# Patient Record
Sex: MALE | Race: WHITE | NOT HISPANIC OR LATINO | URBAN - METROPOLITAN AREA
[De-identification: names, ages, dates, MRNs, and addresses within clinical notes are randomized per-mention and may not be internally consistent; named-entity substitution may affect disease eponyms.]

---

## 2017-01-01 ENCOUNTER — AMBULATORY - HEALTHEAST (OUTPATIENT)
Dept: CARDIOLOGY | Facility: CLINIC | Age: 72
End: 2017-01-01

## 2017-01-01 ENCOUNTER — COMMUNICATION - HEALTHEAST (OUTPATIENT)
Dept: INTERNAL MEDICINE | Facility: CLINIC | Age: 72
End: 2017-01-01

## 2017-01-01 ENCOUNTER — OFFICE VISIT - HEALTHEAST (OUTPATIENT)
Dept: INTERNAL MEDICINE | Facility: CLINIC | Age: 72
End: 2017-01-01

## 2017-01-01 ENCOUNTER — RECORDS - HEALTHEAST (OUTPATIENT)
Dept: ADMINISTRATIVE | Facility: OTHER | Age: 72
End: 2017-01-01

## 2017-01-01 ENCOUNTER — OFFICE VISIT - HEALTHEAST (OUTPATIENT)
Dept: INFECTIOUS DISEASES | Facility: CLINIC | Age: 72
End: 2017-01-01

## 2017-01-01 ENCOUNTER — OFFICE VISIT - HEALTHEAST (OUTPATIENT)
Dept: CARDIOLOGY | Facility: CLINIC | Age: 72
End: 2017-01-01

## 2017-01-01 ENCOUNTER — AMBULATORY - HEALTHEAST (OUTPATIENT)
Dept: INTERNAL MEDICINE | Facility: CLINIC | Age: 72
End: 2017-01-01

## 2017-01-01 ENCOUNTER — COMMUNICATION - HEALTHEAST (OUTPATIENT)
Dept: SCHEDULING | Facility: CLINIC | Age: 72
End: 2017-01-01

## 2017-01-01 ENCOUNTER — COMMUNICATION - HEALTHEAST (OUTPATIENT)
Dept: CARDIOLOGY | Facility: CLINIC | Age: 72
End: 2017-01-01

## 2017-01-01 ENCOUNTER — COMMUNICATION - HEALTHEAST (OUTPATIENT)
Dept: NURSING | Facility: CLINIC | Age: 72
End: 2017-01-01

## 2017-01-01 ENCOUNTER — COMMUNICATION - HEALTHEAST (OUTPATIENT)
Dept: INFECTIOUS DISEASES | Facility: CLINIC | Age: 72
End: 2017-01-01

## 2017-01-01 ENCOUNTER — RECORDS - HEALTHEAST (OUTPATIENT)
Dept: ADMINISTRATIVE | Facility: OTHER | Age: 72
End: 2017-01-01
Payer: COMMERCIAL

## 2017-01-01 ENCOUNTER — SURGERY - HEALTHEAST (OUTPATIENT)
Dept: CARDIOLOGY | Facility: CLINIC | Age: 72
End: 2017-01-01

## 2017-01-01 DIAGNOSIS — I10 ESSENTIAL HYPERTENSION WITH GOAL BLOOD PRESSURE LESS THAN 130/85: ICD-10-CM

## 2017-01-01 DIAGNOSIS — N18.30 CKD (CHRONIC KIDNEY DISEASE) STAGE 3, GFR 30-59 ML/MIN (H): ICD-10-CM

## 2017-01-01 DIAGNOSIS — E11.9 DIABETES MELLITUS, TYPE 2 (H): ICD-10-CM

## 2017-01-01 DIAGNOSIS — T82.7XXD ICD (IMPLANTABLE CARDIOVERTER-DEFIBRILLATOR) INFECTION, SUBSEQUENT ENCOUNTER: ICD-10-CM

## 2017-01-01 DIAGNOSIS — T82.7XXD: ICD-10-CM

## 2017-01-01 DIAGNOSIS — I50.22 CHRONIC SYSTOLIC HEART FAILURE (H): ICD-10-CM

## 2017-01-01 DIAGNOSIS — I33.0 ACUTE BACTERIAL ENDOCARDITIS: ICD-10-CM

## 2017-01-01 DIAGNOSIS — L08.9 WOUND INFECTION: ICD-10-CM

## 2017-01-01 DIAGNOSIS — I42.8 NICM (NONISCHEMIC CARDIOMYOPATHY) (H): ICD-10-CM

## 2017-01-01 DIAGNOSIS — T14.8XXA WOUND INFECTION: ICD-10-CM

## 2017-01-01 DIAGNOSIS — I34.0 NON-RHEUMATIC MITRAL REGURGITATION: ICD-10-CM

## 2017-01-01 DIAGNOSIS — I42.8 OTHER PRIMARY CARDIOMYOPATHIES: ICD-10-CM

## 2017-01-01 DIAGNOSIS — I42.0 CARDIOMYOPATHY, DILATED (H): ICD-10-CM

## 2017-01-01 DIAGNOSIS — I42.9 CARDIOMYOPATHY (H): ICD-10-CM

## 2017-01-01 DIAGNOSIS — I42.8 NONISCHEMIC CARDIOMYOPATHY (H): ICD-10-CM

## 2017-01-01 DIAGNOSIS — M10.9 GOUTY ARTHROPATHY: ICD-10-CM

## 2017-01-01 DIAGNOSIS — Z95.810 ICD (IMPLANTABLE CARDIOVERTER-DEFIBRILLATOR), BIVENTRICULAR, IN SITU: ICD-10-CM

## 2017-01-01 DIAGNOSIS — I44.7 LBBB (LEFT BUNDLE BRANCH BLOCK): ICD-10-CM

## 2017-01-01 DIAGNOSIS — B95.61 BACTEREMIA DUE TO STAPHYLOCOCCUS AUREUS: ICD-10-CM

## 2017-01-01 DIAGNOSIS — R78.81 BACTEREMIA DUE TO STAPHYLOCOCCUS AUREUS: ICD-10-CM

## 2017-01-01 LAB
ATRIAL RATE - MUSE: 71 BPM
CHOLEST SERPL-MCNC: 132 MG/DL
CHOLEST SERPL-MCNC: 132 MG/DL
CHOLEST SERPL-MCNC: 162 MG/DL
DIASTOLIC BLOOD PRESSURE - MUSE: NORMAL MMHG
FASTING STATUS PATIENT QL REPORTED: ABNORMAL
FASTING STATUS PATIENT QL REPORTED: YES
FASTING STATUS PATIENT QL REPORTED: YES
HBA1C MFR BLD: 6 % (ref 3.5–6)
HBA1C MFR BLD: 6.5 % (ref 3.5–6)
HBA1C MFR BLD: 8.4 % (ref 3.5–6)
HDLC SERPL-MCNC: 27 MG/DL
HDLC SERPL-MCNC: 31 MG/DL
HDLC SERPL-MCNC: 34 MG/DL
INTERPRETATION ECG - MUSE: NORMAL
LDLC SERPL CALC-MCNC: 64 MG/DL
LDLC SERPL CALC-MCNC: 75 MG/DL
LDLC SERPL CALC-MCNC: 96 MG/DL
P AXIS - MUSE: 68 DEGREES
PR INTERVAL - MUSE: 128 MS
QRS DURATION - MUSE: 158 MS
QT - MUSE: 452 MS
QTC - MUSE: 491 MS
R AXIS - MUSE: 124 DEGREES
SYSTOLIC BLOOD PRESSURE - MUSE: NORMAL MMHG
T AXIS - MUSE: 73 DEGREES
TRIGL SERPL-MCNC: 150 MG/DL
TRIGL SERPL-MCNC: 171 MG/DL
TRIGL SERPL-MCNC: 173 MG/DL
VENTRICULAR RATE- MUSE: 71 BPM

## 2017-01-01 RX ORDER — CARVEDILOL 12.5 MG/1
18.75 TABLET ORAL 2 TIMES DAILY WITH MEALS
Qty: 180 TABLET | Refills: 3 | Status: SHIPPED | OUTPATIENT
Start: 2017-01-01

## 2017-01-01 RX ORDER — ALBUTEROL SULFATE 90 UG/1
1-2 AEROSOL, METERED RESPIRATORY (INHALATION) EVERY 4 HOURS PRN
Qty: 1 INHALER | Refills: 11 | Status: SHIPPED | OUTPATIENT
Start: 2017-01-01

## 2017-01-01 RX ORDER — TAMSULOSIN HYDROCHLORIDE 0.4 MG/1
CAPSULE ORAL
Qty: 30 CAPSULE | Refills: 0 | Status: SHIPPED | OUTPATIENT
Start: 2017-01-01

## 2017-01-01 RX ORDER — FUROSEMIDE 40 MG
TABLET ORAL
Qty: 90 TABLET | Refills: 3 | Status: SHIPPED | OUTPATIENT
Start: 2017-01-01

## 2017-01-01 RX ORDER — BLOOD SUGAR DIAGNOSTIC
STRIP MISCELLANEOUS
Qty: 100 STRIP | Refills: 0 | Status: SHIPPED | OUTPATIENT
Start: 2017-01-01

## 2017-01-01 RX ORDER — SIMVASTATIN 20 MG
20 TABLET ORAL AT BEDTIME
Qty: 30 TABLET | Refills: 5 | Status: SHIPPED | OUTPATIENT
Start: 2017-01-01

## 2017-01-01 RX ORDER — ALLOPURINOL 300 MG/1
TABLET ORAL
Qty: 90 TABLET | Refills: 3 | Status: SHIPPED | OUTPATIENT
Start: 2017-01-01

## 2017-01-01 RX ORDER — SACCHAROMYCES BOULARDII 250 MG
250 CAPSULE ORAL 2 TIMES DAILY
Status: SHIPPED | COMMUNITY
Start: 2017-01-01

## 2017-01-01 RX ORDER — FUROSEMIDE 40 MG
0.5 TABLET ORAL 2 TIMES DAILY
Refills: 3 | Status: SHIPPED | COMMUNITY
Start: 2017-01-01

## 2017-01-01 ASSESSMENT — MIFFLIN-ST. JEOR
SCORE: 1455.21
SCORE: 1468.81
SCORE: 1477.89
SCORE: 1502.83
SCORE: 1459.74
SCORE: 1489.22
SCORE: 1491.49
SCORE: 1473.35
SCORE: 1477.89
SCORE: 1502.83
SCORE: 1488.31
SCORE: 1484.68
SCORE: 1471.08
SCORE: 1484.68

## 2017-08-28 ENCOUNTER — COMMUNICATION - HEALTHEAST (OUTPATIENT)
Dept: INTERNAL MEDICINE | Facility: CLINIC | Age: 72
End: 2017-08-28

## 2021-05-26 ENCOUNTER — RECORDS - HEALTHEAST (OUTPATIENT)
Dept: ADMINISTRATIVE | Facility: CLINIC | Age: 76
End: 2021-05-26

## 2021-05-30 ENCOUNTER — RECORDS - HEALTHEAST (OUTPATIENT)
Dept: ADMINISTRATIVE | Facility: CLINIC | Age: 76
End: 2021-05-30

## 2021-05-30 VITALS — BODY MASS INDEX: 22.82 KG/M2 | WEIGHT: 163 LBS | HEIGHT: 71 IN

## 2021-05-30 VITALS — HEIGHT: 72 IN | WEIGHT: 162 LBS | BODY MASS INDEX: 21.94 KG/M2

## 2021-05-30 VITALS — BODY MASS INDEX: 21.94 KG/M2 | WEIGHT: 162 LBS | HEIGHT: 72 IN

## 2021-05-30 VITALS — BODY MASS INDEX: 22.26 KG/M2 | WEIGHT: 159 LBS | HEIGHT: 71 IN

## 2021-05-30 VITALS — WEIGHT: 158 LBS | BODY MASS INDEX: 22.12 KG/M2 | HEIGHT: 71 IN

## 2021-05-30 VITALS — WEIGHT: 157 LBS | BODY MASS INDEX: 21.9 KG/M2

## 2021-05-30 VITALS — WEIGHT: 159 LBS | HEIGHT: 72 IN | BODY MASS INDEX: 21.54 KG/M2

## 2021-05-30 VITALS — BODY MASS INDEX: 21.4 KG/M2 | WEIGHT: 158 LBS | HEIGHT: 72 IN

## 2021-05-30 VITALS — WEIGHT: 156 LBS | BODY MASS INDEX: 21.76 KG/M2

## 2021-05-30 VITALS — HEIGHT: 71 IN | WEIGHT: 160 LBS | BODY MASS INDEX: 22.4 KG/M2

## 2021-05-30 VITALS — BODY MASS INDEX: 21.51 KG/M2 | WEIGHT: 158.8 LBS | HEIGHT: 72 IN

## 2021-05-30 VITALS — BODY MASS INDEX: 22.4 KG/M2 | WEIGHT: 160 LBS | HEIGHT: 71 IN

## 2021-05-31 VITALS — HEIGHT: 71 IN | BODY MASS INDEX: 21.84 KG/M2 | WEIGHT: 156 LBS

## 2021-05-31 VITALS — WEIGHT: 155 LBS | BODY MASS INDEX: 21.7 KG/M2 | HEIGHT: 71 IN

## 2021-06-01 ENCOUNTER — RECORDS - HEALTHEAST (OUTPATIENT)
Dept: ADMINISTRATIVE | Facility: CLINIC | Age: 76
End: 2021-06-01

## 2021-06-08 NOTE — PROGRESS NOTES
Office Visit - Physical    Esteban Zambrano   71 y.o. male    Date of Visit: 1/6/2017    Chief Complaint   Patient presents with     Pre-op Exam     EP ICD insert  Dr. Jese Menendez at [facility]  on 1/18/2017       Subjective: Preoperative examination in anticipation of ICD pacemaker insertion Dr. Jese Menendez January 18, 2017 [facility] catheterization lab.    71-year-old male with history of nonischemic cardiomyopathy with ejection fractions around 35%.  No signs or symptoms of volume overload denies paroxysmal nocturnal dyspnea or leg edema generally feels well.  Denies palpitations or chest pain.  Not lightheaded.  Patient's had a long history of nonischemic cardiomyopathy and prior history of ejection fractions as well as 15 or 20% the patient did have a pacemaker defibrillator device removed because of endocarditis and bacteremia with methicillin sensitive staph aureus intermittently since the fall of 2015.  Hospitalized at [facility] as well as at [facility].    ROS: A comprehensive review of systems was performed and was otherwise negative    Medications:   Prior to Admission medications    Medication Sig Start Date End Date Taking? Authorizing Provider   allopurinol (ZYLOPRIM) 300 MG tablet Take 1 tablet (300 mg total) by mouth daily. 7/27/16  Yes Emani King MD   aspirin (ASPIR-81) 81 MG EC tablet Take 1 tablet (81 mg total) by mouth daily with supper. 7/27/16  Yes Emani King MD   carvedilol (COREG) 25 MG tablet Take 1 tablet (25 mg total) by mouth 2 (two) times a day with meals. 12/19/16  Yes Jesus Acevedo MD   cholecalciferol, vitamin D3, 1,000 unit tablet Take 1 tablet (1,000 Units total) by mouth daily. 7/27/16  Yes Emani King MD   ferrous sulfate 325 (65 FE) MG tablet Take 1 tablet by mouth daily with breakfast. 7/27/16  Yes Emani King MD   furosemide (LASIX) 20 MG tablet Take 1 tablet (20 mg total) by mouth daily.  Patient taking differently: Take  "20 mg by mouth 2 (two) times a day at 9am and 6pm.  7/27/16  Yes Emani King MD   JANUVIA 50 mg tablet TAKE 1 TABLET BY MOUTH DAILY. 12/10/16  Yes Jesus Acevedo MD   pirbuterol (MAXAIR) 200 mcg/Inhalation inhaler Inhale 2 puffs 4 (four) times a day as needed for wheezing. Prn   Yes PROVIDER, HISTORICAL   potassium chloride SA (K-DUR,KLOR-CON) 20 MEQ tablet Take 1 tablet (20 mEq total) by mouth 2 (two) times a day. 10/21/16 10/21/17 Yes Jesus Acevedo MD   simvastatin (ZOCOR) 20 MG tablet Take 1 tablet (20 mg total) by mouth bedtime. 7/27/16  Yes Emani King MD   tamsulosin (FLOMAX) 0.4 mg Cp24 TAKE 1 CAPSULE BY MOUTH ONCE DAILY AFTER SUPPER 12/19/16  Yes Jesus Acevedo MD   triamcinolone (KENALOG) 0.1 % ointment Apply topically 2 (two) times a day.   Yes PROVIDER, HISTORICAL   ACCU-CHEK GUILLAUME PLUS TEST STRP strips TEST THREE TIMES DAILY 12/23/16   Bakari Rick MD       Allergies:  Allergies   Allergen Reactions     Cefazolin Other (See Comments)     Severe neutropenia     Sulfa (Sulfonamide Antibiotics) Anaphylaxis     Throat swelling.     Dimethicone Rash     Atorvastatin      Hair Loss     Lisinopril Unknown     Losartan Other (See Comments)     Altered vision \"white out\"       Immunizations:   Immunization History   Administered Date(s) Administered     DT (pediatric) 01/01/2000     Influenza [id number], 01/04/2010     Influenza, inj, historic 11/07/2007, 10/23/2008, 09/03/2015     Pneumo Polysac 23-V 10/08/2009     Td, historic 01/01/2000, 07/07/2011     Tdap 07/11/2011       Health Maintenance: Immunizations reviewed and up-to-date.    Past Medical History: Complicated past medical history significant for diabetes mellitus type 2 with chronic anemia and history of acute kidney failure on top of chronic kidney disease stage III.    Nonischemic cardiomyopathy previously required pacemaker biventricular and ICD.    History of C. diff colitis and thrombocytopenia and lymphocytopenia related " to medication treatment.    History of methicillin sensitive staph aureus bacteremia and endocarditis with probable vegetation on prior right atrial lead now removed.  Previous treatment with long-term antibiotic therapy 6 weeks and infectious disease consultation was along with cardiology consultation.  An nephrology consultation chronic kidney disease with acute kidney injury previously.  See problem list and allergy list for details.    Nonsmoker no excess alcohol.  Other past medical and surgical history see old records.  Prior surgical history includes that of cardiac pacemaker implantation with defibrillator along with vasectomy nasal surgery cataract surgery.  Team line insertion as well as vasectomy in the remote past    : Family history reviewed includes Alzheimer's disease in his mother heart disease in his father.  One daughter with thyroid nodule to be removed January 11, 2017 Dr. Maryam Mendez at [facility].  Wife with osteoarthritis and very supportive Ursula.    Family History: See above.    Social History: Retired    Exam Chest clear to auscultation and percussion.  Heart tones regular rhythm without murmur rub or gallop.  Abdomen soft nontender no organomegaly.  No peritoneal signs.  Extremities free of edema cyanosis or clubbing.  Neck veins nondistended no thyromegaly or scleral icterus noted, carotids full.  Skin warm and dry easily conversant good spirited.  Normal intelligence.  Neurologically intact no gross localizing findings.  Not in acute distress not toxic he does appear stronger from a chronically ill standpoint.  No tachypnea or central acrocyanosis severe eczema previously noted again confirmed but not secondarily infected.    Assessment and Plan  Nonischemic cardiomyopathy with impaired ejection fraction and systolic performance needs ICD and biventricular pacer as he previously had.    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 diff toxin associated colitis lymphocytopenia and thrombocytopenia related to antibiotic therapy.  See record and problem list.    Diabetes mellitus type 2 plus or eczema.  And history of acute kidney injury resolved on top of chronic kidney disease CK D3.    Total time spent with the patient today was 40 minutes of which greater than 50% was spent in counseling and coordination of care.    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

## 2021-06-08 NOTE — PROGRESS NOTES
Office Visit - Follow up    Esteban Zambrano   71 y.o. male    Date of Visit: 2/9/2017    Chief Complaint   Patient presents with     Hospital Visit Follow Up     ICD pacemaker insertion     Hypertension     Diabetes     Pain     thumb       Subjective: Gout.    Painful right thumb at the base.  Minimally swollen not hot not read woke up this morning with it.  Hard to move it.  Very painful.    Hospital follow-up from January 18, 2017 history of pacemaker reinsertion biventricular for nonischemic cardiomyopathy.  Also hypertension and hyper glycemia with diabetes mellitus type 2 and hyperlipidemia.    No blood in stool or urine.  No chest pain or shortness of breath denies paroxysmal nocturnal dyspnea.  Anticipate screws to the [place] 10 days in 1 week.  If no better in one week I did suggest prednisone therapy for his right upper painful thumb area could be gout uric acid level pending.    Medication list reviewed and we will tolerated.  For this 71-year-old male.    ROS: A comprehensive review of systems was performed and was otherwise negative    Medications:  Prior to Admission medications    Medication Sig Start Date End Date Taking? Authorizing Provider   allopurinol (ZYLOPRIM) 300 MG tablet Take 1 tablet (300 mg total) by mouth daily. 7/27/16  Yes Emani King MD   aspirin (ASPIR-81) 81 MG EC tablet Take 1 tablet (81 mg total) by mouth daily with supper. 7/27/16  Yes Emani King MD   carvedilol (COREG) 25 MG tablet Take 1 tablet (25 mg total) by mouth 2 (two) times a day with meals. 12/19/16  Yes Jesus Acevedo MD   cholecalciferol, vitamin D3, 1,000 unit tablet Take 1 tablet (1,000 Units total) by mouth daily. 7/27/16  Yes Emani King MD   ferrous sulfate 325 (65 FE) MG tablet Take 1 tablet by mouth daily with breakfast. 7/27/16  Yes Emani King MD   furosemide (LASIX) 20 MG tablet Take 20 mg by mouth 2 (two) times a day at 9am and 6pm.   Yes PROVIDER, MONSE   JANUVIA 50 mg tablet  "TAKE 1 TABLET BY MOUTH DAILY. 1/15/17  Yes Jesus Acevedo MD   pirbuterol (MAXAIR) 200 mcg/Inhalation inhaler Inhale 2 puffs 4 (four) times a day as needed for wheezing. Prn   Yes PROVIDER, HISTORICAL   potassium chloride SA (K-DUR,KLOR-CON) 20 MEQ tablet Take 1 tablet (20 mEq total) by mouth 2 (two) times a day. 10/21/16 10/21/17 Yes Jesus Acevedo MD   simvastatin (ZOCOR) 20 MG tablet Take 1 tablet (20 mg total) by mouth bedtime. 7/27/16  Yes Emani King MD   tamsulosin (FLOMAX) 0.4 mg Cp24 TAKE 1 CAPSULE BY MOUTH ONCE DAILY AFTER SUPPER 1/22/17  Yes Jesus Acevedo MD   triamcinolone (KENALOG) 0.1 % ointment Apply topically 2 (two) times a day.   Yes PROVIDER, HISTORICAL   ACCU-CHEK GUILLAUME PLUS TEST STRP strips TEST THREE TIMES DAILY 12/23/16   Bakari Rick MD       Allergies:   Allergies   Allergen Reactions     Cefazolin Other (See Comments)     Severe neutropenia     Sulfa (Sulfonamide Antibiotics) Anaphylaxis     Throat swelling.     Dimethicone Rash     Atorvastatin      Hair Loss     Lisinopril Unknown     Losartan Other (See Comments)     Altered vision \"white out\"       Immunizations:   Immunization History   Administered Date(s) Administered     DT (pediatric) 01/01/2000     Influenza [id number], 01/04/2010     Influenza, inj, historic 11/07/2007, 10/23/2008, 09/03/2015     Pneumo Conj 13-V (2010&after) 02/09/2017     Pneumo Polysac 23-V 10/08/2009     Td, historic 01/01/2000, 07/07/2011     Tdap 07/11/2011       Exam painful right thumb base and interphalangeal joint of the right thumb.  He is right-hand dominant.    Chest clear.  Heart tones normal.  Abdomen benign.  Extremities free of edema.  Neck veins nondistended no thyromegaly or scleral icterus skin showed eczematous changes good color wife is at his side and supportive neurologic and psych normal.    Assessment and Plan   Gout check uric acid level today of her prednisone therapy 30 mg daily for 5 days now but patient " declined.  We will use heat or ice topically to the right thumb area plus uric acid level will be checked plus acetaminophen 1000 mg 3 times a day and a scheduled basis per B7 days.  Ibuprofen and the like maybe to harmful in a patient with known multiple medical problems and prior history of chronic kidney disease worsening of renal function.    Nonischemic cardiomyopathy status post biventricular pacer.    History of infective endocarditis.    Hypertension and hyperlipidemia.    Diabetes mellitus type 2 with next office visit in one month we'll do fasting diabetic labs to include A1c lipid panel urine for microalbumin and blood sugar.  RTC 1 month.  Call in 1 week or less prior to trip pending symptoms of his right thumb.    Time: total time spent with the patient was 25 minutes of which >50% was spent in counseling and coordination of care    The following high BMI interventions were performed this visit: encouragement to exercise    Jesus Acevedo MD    Patient Active Problem List   Diagnosis     Gout     Tendonitis Of The Right Achilles Tendon     Anemia     Type 2 Diabetes Mellitus     Essential Hypertension     CKD (chronic kidney disease) stage 3, GFR 30-59 ml/min     Diabetes     NICM (nonischemic cardiomyopathy)     HTN (hypertension)     Frozen shoulder syndrome, right     Bacteremia due to Staphylococcus aureus     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

## 2021-06-08 NOTE — PROGRESS NOTES
"DEVICE CLINIC RN POST-OP NOTE    Reason for visit: Post-operative wound and device check; s/p re-implant of cardiac resynchronization therapy defibrillator (CRT-D)     Device: [employer] G148 INOGEN X4 CRT-D, RA Lead: [employer] [id number] [address] II Sterox EZ, RV Lead: [employer] [id number] [place] 4-Site SG LV Lead: [employer] [id number] Acuity X4 Straight  Procedure date: 01/18/2017  Implant Diagnosis: Non-ischemic Cardiomyopathy, Left Bundle Branch Block, S/P ICD extraction endocarditis July 2016  Implanting Physician: Dr. Jese Menendez      Assessment  Subjective: \"I'm feeling just fine.  No problems.\"  Physical Assessment: Mr. Cueva seeing Jo Tyson, RN, CNP in follow-up today, please refer to her note for a full physical assessment.  Incision/device pocket: Clean, dry and intact with resolving ecchymosis and edema.  Ecchymosis extends into left breast.  There are no signs of infection present.  The Steri-strips were removed at today's visit and the area cleaned of residual adhesive.      Device Findings  Interrogation of device reveals normal sensing and capture thresholds  See the [employer] Report for a full summary of the device information.        Patient Education  Esteban Zambrano was accompanied today by his wifeKim Banks.     [facility]'s Partnership Agreement for Device Patients and Post-operative Checklist were presented and reviewed with patient at today's appointment.    Signs and symptoms of infection, poor wound healing, and normal device function were reviewed. Range of motion and weight restrictions for the left arm are for four weeks. He was issued a Med[employer] DX NXT remote monitor and instructed on its set-up and use for remote monitoring by the [employer] representative prior to hospital discharge. These instructions were reviewed with the patient at today s visit.       Plan  - Remote device check on 02/27/2017  - Three month " in-clinic device check April 2017    Annmarie Calvin, RN, MA  Device Nurse  [facility]

## 2021-06-09 ENCOUNTER — RECORDS - HEALTHEAST (OUTPATIENT)
Dept: ADMINISTRATIVE | Facility: CLINIC | Age: 76
End: 2021-06-09

## 2021-06-09 NOTE — PROGRESS NOTES
Office Visit - Follow up    Esteban Zambrano   71 y.o. male    Date of Visit: 3/7/2017    Chief Complaint   Patient presents with     Hospital Visit Follow Up       Subjective: diabetes  mellitus type II with gout flare right wrist.  Right-handed.  Cannot use right hand.    On cruise ship and in [place] port became ill with signs and symptoms of recurrent endocarditis.  Staphylococcal organism.  ICD pacemaker device removed upon air lifting to [place].  Transesophageal echo also done.  Full details in extensive medical records reviewed today.  On vancomycin versus nafcillin every 6 hours.  Also allopurinol dose noted 300 mg daily prior history of chronic kidney disease may need adjustment downward.  On cruise ship when came down with a fever was noted to have endocarditis.  Treated with vancomycin.  There is some record of nafcillin having been administered as well.    No blood in stool or urine no chest pain or shortness of breath.  Seeing cardiologist tomorrow.  ROS: A comprehensive review of systems was performed and was otherwise negative    Medications:  Prior to Admission medications    Medication Sig Start Date End Date Taking? Authorizing Provider   allopurinol (ZYLOPRIM) 300 MG tablet Take 1 tablet (300 mg total) by mouth daily. 7/27/16  Yes Emani King MD   aspirin (ASPIR-81) 81 MG EC tablet Take 1 tablet (81 mg total) by mouth daily with supper. 7/27/16  Yes Emani King MD   carvedilol (COREG) 25 MG tablet Take 1 tablet (25 mg total) by mouth 2 (two) times a day with meals. 12/19/16  Yes Jesus Acevedo MD   cholecalciferol, vitamin D3, 1,000 unit tablet Take 1 tablet (1,000 Units total) by mouth daily. 7/27/16  Yes Emani King MD   ferrous sulfate 325 (65 FE) MG tablet Take 1 tablet by mouth daily with breakfast. 7/27/16  Yes Emani King MD   furosemide (LASIX) 20 MG tablet Take 20 mg by mouth 2 (two) times a day at 9am and 6pm.   Yes PROVIDER, MONSE   JANUVIA 50 mg tablet TAKE 1 TABLET  "BY MOUTH DAILY. 2/10/17  Yes Jesus Acevedo MD   pirbuterol (MAXAIR) 200 mcg/Inhalation inhaler Inhale 2 puffs 4 (four) times a day as needed for wheezing. Prn   Yes PROVIDER, HISTORICAL   potassium chloride SA (K-DUR,KLOR-CON) 20 MEQ tablet Take 1 tablet (20 mEq total) by mouth 2 (two) times a day. 10/21/16 10/21/17 Yes Jesus Acevedo MD   Saccharomyces boulardii (FLORASTOR) 250 mg capsule Take 250 mg by mouth 3 (three) times a day.   Yes PROVIDER, HISTORICAL   simvastatin (ZOCOR) 20 MG tablet Take 1 tablet (20 mg total) by mouth bedtime. 7/27/16  Yes Emani King MD   tamsulosin (FLOMAX) 0.4 mg Cp24 TAKE 1 CAPSULE BY MOUTH ONCE DAILY AFTER SUPPER 2/10/17  Yes Jesus Acevedo MD   triamcinolone (KENALOG) 0.1 % ointment Apply topically 2 (two) times a day.   Yes PROVIDER, HISTORICAL   vancomycin (VANCOCIN) 250 mg/5 mL Syrg suspension Take by mouth.   Yes PROVIDER, HISTORICAL   ACCU-CHEK GUILLAUME PLUS TEST STRP strips TEST THREE TIMES DAILY 12/23/16   Bakari Rick MD   colchicine 0.6 mg tablet Take 1 tablet (0.6 mg total) by mouth 2 (two) times a day. 3/7/17 3/7/18  Jesus Acevedo MD   predniSONE (DELTASONE) 10 MG tablet Take 3 tablets daily for 5 days. 2/13/17 3/7/17  Jesus Acevedo MD       Allergies:   Allergies   Allergen Reactions     Cefazolin Other (See Comments)     Severe neutropenia     Sulfa (Sulfonamide Antibiotics) Anaphylaxis     Throat swelling.     Dimethicone Rash     Atorvastatin      Hair Loss     Lisinopril Unknown     Losartan Other (See Comments)     Altered vision \"white out\"       Immunizations:   Immunization History   Administered Date(s) Administered     DT (pediatric) 01/01/2000     Influenza [id number], 01/04/2010     Influenza, inj, historic 11/07/2007, 10/23/2008, 09/03/2015     Pneumo Conj 13-V (2010&after) 02/09/2017     Pneumo Polysac 23-V 10/08/2009     Td, historic 01/01/2000, 07/07/2011     Tdap 07/11/2011       Exam Chest clear to auscultation and " percussion.  Heart tones regular rhythm without murmur rub or gallop.  Abdomen soft nontender no organomegaly.  No peritoneal signs.  Extremities free of edema cyanosis or clubbing.  Neck veins nondistended no thyromegaly or scleral icterus noted, carotids full.  Skin warm and dry easily conversant good spirited.  Normal intelligence.  Neurologically intact no gross localizing findings.  Eczematous skin and past health history of diabetes mellitus set patient up for contamination of any and no vascular device like ICD pacemaker.    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  Needs cardiac follow-up with infectious disease consultation as well.  Diabetes mellitus type 2 check hemogram plus comprehensive metabolic profile A1c lipid panel uric acid level today.    Gout flare right wrist.  Right-hand-dominant multiple drug allergies noted see chart.  Start colchicine 0.6 mg twice daily return to clinic March 23, 2017    Time: total time spent with the patient was 40 minutes of which >50% was spent in counseling and coordination of care    The following high BMI interventions were performed this visit: encouragement to exercise    Jesus Acevedo MD    Patient Active Problem List   Diagnosis     Gout     Tendonitis Of The Right Achilles Tendon     Anemia     Type 2 Diabetes Mellitus     Essential Hypertension     CKD (chronic kidney disease) stage 3, GFR 30-59 ml/min     Diabetes     NICM (nonischemic cardiomyopathy)     HTN (hypertension)     Frozen shoulder syndrome, right     Bacteremia due to Staphylococcus aureus     Recurrent colitis due to Clostridium difficile     Infected defibrillator, initial encounter     Acute bacterial endocarditis     Hypokalemia     Infection involving implantable cardioverter-defibrillator, subsequent encounter     Infective endocarditis     Onychauxis     Anemia of chronic disease     Suspected deep tissue injury     Venous ulcer of left leg      LBBB (left bundle branch block)     Heart failure with reduced ejection fraction     ICD (implantable cardioverter-defibrillator), biventricular, in situ

## 2021-06-09 NOTE — PROGRESS NOTES
Office Visit - Follow up    Esteban Zambrano   71 y.o. male    Date of Visit: 4/6/2017    Chief Complaint   Patient presents with     Endocarditis     follow up     Diabetes       Subjective: Diabetes mellitus type 2.  Recurrent endocarditis ×4.  Secondary to implantable device defibrillator plus pacemaker.  Now all implantable cardio device cardiac devices are out.  Needs refill Maxair will try albuterol metered-dose inhaler.    No blood in stool or urine.  Feels stronger.  Accompanied by his wife slightly randall randall in complexion especially cheeks the patient has been receiving nafcillin IV at home with a PICC line every 4 hours.  The patient has had for symptoms of bacterial endocarditis include chills with high fever.  The skin is broken down from chronic eczema.    First sign of illness with endocarditis his fever.    No blood in stool or urine or fever now medication list reviewed generally well-tolerated.  Denies syncope or palpitations.    ROS: A comprehensive review of systems was performed and was otherwise negative    Medications:  Prior to Admission medications    Medication Sig Start Date End Date Taking? Authorizing Provider   allopurinol (ZYLOPRIM) 300 MG tablet Take 1 tablet (300 mg total) by mouth daily. 7/27/16  Yes Emani King MD   aspirin (ASPIR-81) 81 MG EC tablet Take 1 tablet (81 mg total) by mouth daily with supper. 7/27/16  Yes Emani King MD   carvedilol (COREG) 25 MG tablet Take 0.5 tablets (12.5 mg total) by mouth 2 (two) times a day with meals. 3/8/17  Yes Jese Menendez MD   cholecalciferol, vitamin D3, 1,000 unit tablet Take 1 tablet (1,000 Units total) by mouth daily. 7/27/16  Yes Emani King MD   colchicine 0.6 mg tablet Take 1 tablet (0.6 mg total) by mouth 2 (two) times a day. 3/7/17 3/7/18 Yes Jesus Acevedo MD   ferrous sulfate 325 (65 FE) MG tablet Take 1 tablet by mouth daily with breakfast. 7/27/16  Yes Emani King MD   furosemide (LASIX) 20 MG tablet Take 20  "mg by mouth 2 (two) times a day at 9am and 6pm.   Yes PROVIDER, HISTORICAL   JANUVIA 50 mg tablet TAKE 1 TABLET BY MOUTH DAILY. 4/2/17  Yes Jesus Acevedo MD   pirbuterol (MAXAIR) 200 mcg/Inhalation inhaler Inhale 2 puffs 4 (four) times a day as needed for wheezing. Prn   Yes PROVIDER, HISTORICAL   potassium chloride SA (K-DUR,KLOR-CON) 20 MEQ tablet Take 1 tablet (20 mEq total) by mouth 2 (two) times a day. 10/21/16 10/21/17 Yes Jesus Acevedo MD   Saccharomyces boulardii (FLORASTOR) 250 mg capsule Take 250 mg by mouth 2 (two) times a day.    Yes PROVIDER, HISTORICAL   simvastatin (ZOCOR) 20 MG tablet Take 1 tablet (20 mg total) by mouth bedtime. 7/27/16  Yes Emani King MD   tamsulosin (FLOMAX) 0.4 mg Cp24 TAKE 1 CAPSULE BY MOUTH ONCE DAILY AFTER SUPPER 3/12/17  Yes Jesus Acevedo MD   triamcinolone (KENALOG) 0.1 % ointment Apply topically 2 (two) times a day.   Yes PROVIDER, HISTORICAL   vancomycin (VANCOCIN) 250 mg/5 mL Syrg suspension Take by mouth.   Yes PROVIDER, HISTORICAL   ACCU-CHEK GUILLAUME PLUS TEST STRP strips TEST THREE TIMES DAILY 12/23/16   Bakari Rick MD   albuterol (PROVENTIL HFA;VENTOLIN HFA) 90 mcg/actuation inhaler Inhale 1-2 puffs every 4 (four) hours as needed for wheezing. 4/6/17   Jesus Acevedo MD   citric acid-sodium citrate (BICITRA) 500-334 mg/5 mL solution TAKE 30 ML BY MOUTH EVERY 8 HOURS FOR 10 DAYS 3/6/17 4/6/17  PROVIDER, HISTORICAL   simvastatin (ZOCOR) 20 MG tablet TAKE ONE TABLET BY MOUTH NIGHTLY AT BEDTIME 4/2/17 4/6/17  Jesus Acevedo MD       Allergies:   Allergies   Allergen Reactions     Cefazolin Other (See Comments)     Severe neutropenia     Sulfa (Sulfonamide Antibiotics) Anaphylaxis     Throat swelling.     Dimethicone Rash     Atorvastatin      Hair Loss     Lisinopril Unknown     Losartan Other (See Comments)     Altered vision \"white out\"       Immunizations:   Immunization History   Administered Date(s) Administered     DT " (pediatric) 01/01/2000     Influenza [id number], 01/04/2010     Influenza, inj, historic 11/07/2007, 10/23/2008, 09/03/2015     Pneumo Conj 13-V (2010&after) 02/09/2017     Pneumo Polysac 23-V 10/08/2009     Td, historic 01/01/2000, 07/07/2011     Tdap 07/11/2011       Exam Chest clear to auscultation and percussion.  Heart tones regular rhythm without murmur rub or gallop.  Abdomen soft nontender no organomegaly.  No peritoneal signs.  Extremities free of edema cyanosis or clubbing.  Neck veins nondistended no thyromegaly or scleral icterus noted, carotids full.  Skin warm and dry easily conversant good spirited.  Normal intelligence.  Neurologically intact no gross localizing findings.    Assessment and Plan  Diabetes mellitus type 2.  Check A1c plus lipid panel urine for microalbumin and blood sugar today nonfasting.    Eczema severe with skin breakdown.  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 Sal Brown infectious disease.    Multiple drug allergies including dimethicone atorvastatin lisinopril and losartan.    History of recurrent endocarditis all intracardiac devices are now out followed by Dr. Menendez.    Time: total time spent with the patient was 25 minutes of which >50% was spent in counseling and coordination of care    The following high BMI interventions were performed this visit: encouragement to exercise    Jesus Acevedo MD    Patient Active Problem List   Diagnosis     Gout     Tendonitis Of The Right Achilles Tendon     Anemia     Type 2 Diabetes Mellitus     Essential Hypertension     CKD (chronic kidney disease) stage 3, GFR 30-59 ml/min     Diabetes     NICM (nonischemic cardiomyopathy)     HTN (hypertension)     Frozen shoulder syndrome, right     Bacteremia due to Staphylococcus aureus     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

## 2021-06-09 NOTE — PROGRESS NOTES
Office Visit - Follow up    Esteban Zambrano   71 y.o. male    Date of Visit: 3/23/2017    Chief Complaint   Patient presents with     Follow-up     Has been experiencing dizziness and chills Wife reports a fever yesterday       Subjective: Infective endocarditis with diabetes mellitus and recent spell of gout right wrist and hand the latter improving.  The patient's nafcillin dose is being adjusted upward to every 4 hours from every 6 hours from his infectious disease specialist Dr. Sal Brown.  The patient feels dizzy on higher dose of nafcillin or the frequency at every 4 hours compared every 6 hours.  Discussed with patient and his wife also in attendance.    No fever or chills no night sweats.    History of eczema plus ischemic cardiomyopathy status post ICD pacemaker explantation again.  Dr. Menendez is consulting cardiac electrophysiologist says no more pacemakers defibrillators.    Because of the risk of endocarditis requiring 2 explantation's.    No shortness of breath chest pain or paroxysmal nocturnal dyspnea no leg edema.  Reemphasized salt restriction and diet.    Trip interrupted cruise recently on February 19, 2017 forms were completed in detail for trip insurance.  No blood in stool or urine medication list reviewed.  Dizziness with higher nafcillin dose discussed suggest reconsultation with infectious disease specialist in this regard.    ROS: A comprehensive review of systems was performed and was otherwise negative    Medications:  Prior to Admission medications    Medication Sig Start Date End Date Taking? Authorizing Provider   ACCU-CHEK GUILLAUME PLUS TEST STRP strips TEST THREE TIMES DAILY 12/23/16  Yes Bakari Rick MD   allopurinol (ZYLOPRIM) 300 MG tablet Take 1 tablet (300 mg total) by mouth daily. 7/27/16  Yes Emani King MD   aspirin (ASPIR-81) 81 MG EC tablet Take 1 tablet (81 mg total) by mouth daily with supper. 7/27/16  Yes Emani King MD   carvedilol (COREG) 25 MG tablet  Take 0.5 tablets (12.5 mg total) by mouth 2 (two) times a day with meals. 3/8/17  Yes Jese Menendez MD   cholecalciferol, vitamin D3, 1,000 unit tablet Take 1 tablet (1,000 Units total) by mouth daily. 7/27/16  Yes Emani King MD   citric acid-sodium citrate (BICITRA) 500-334 mg/5 mL solution TAKE 30 ML BY MOUTH EVERY 8 HOURS FOR 10 DAYS 3/6/17  Yes PROVIDER, HISTORICAL   colchicine 0.6 mg tablet Take 1 tablet (0.6 mg total) by mouth 2 (two) times a day. 3/7/17 3/7/18 Yes Jesus Acevedo MD   ferrous sulfate 325 (65 FE) MG tablet Take 1 tablet by mouth daily with breakfast. 7/27/16  Yes Emani King MD   furosemide (LASIX) 20 MG tablet Take 20 mg by mouth 2 (two) times a day at 9am and 6pm.   Yes PROVIDER, HISTORICAL   JANUVIA 50 mg tablet TAKE 1 TABLET BY MOUTH DAILY. 2/10/17  Yes Jesus Acevedo MD   pirbuterol (MAXAIR) 200 mcg/Inhalation inhaler Inhale 2 puffs 4 (four) times a day as needed for wheezing. Prn   Yes PROVIDER, HISTORICAL   potassium chloride SA (K-DUR,KLOR-CON) 20 MEQ tablet Take 1 tablet (20 mEq total) by mouth 2 (two) times a day. 10/21/16 10/21/17 Yes Jesus Acevedo MD   Saccharomyces boulardii (FLORASTOR) 250 mg capsule Take 250 mg by mouth 3 (three) times a day.   Yes PROVIDER, HISTORICAL   simvastatin (ZOCOR) 20 MG tablet Take 1 tablet (20 mg total) by mouth bedtime. 7/27/16  Yes Emani King MD   tamsulosin (FLOMAX) 0.4 mg Cp24 TAKE 1 CAPSULE BY MOUTH ONCE DAILY AFTER SUPPER 3/12/17  Yes Jesus Acevedo MD   triamcinolone (KENALOG) 0.1 % ointment Apply topically 2 (two) times a day.   Yes PROVIDER, HISTORICAL   vancomycin (VANCOCIN) 250 mg/5 mL Syrg suspension Take by mouth.   Yes PROVIDER, HISTORICAL       Allergies:   Allergies   Allergen Reactions     Cefazolin Other (See Comments)     Severe neutropenia     Sulfa (Sulfonamide Antibiotics) Anaphylaxis     Throat swelling.     Dimethicone Rash     Atorvastatin      Hair Loss     Lisinopril Unknown     Losartan Other  "(See Comments)     Altered vision \"white out\"       Immunizations:   Immunization History   Administered Date(s) Administered     DT (pediatric) 01/01/2000     Influenza [id number], 01/04/2010     Influenza, inj, historic 11/07/2007, 10/23/2008, 09/03/2015     Pneumo Conj 13-V (2010&after) 02/09/2017     Pneumo Polysac 23-V 10/08/2009     Td, historic 01/01/2000, 07/07/2011     Tdap 07/11/2011       Exam Chest clear to auscultation and percussion.  Heart tones regular rhythm without murmur rub or gallop.  Abdomen soft nontender no organomegaly.  No peritoneal signs.  Extremities free of edema cyanosis or clubbing.  Neck veins nondistended no thyromegaly or scleral icterus noted, carotids full.  Skin warm and dry easily conversant good spirited.  Normal intelligence.  Neurologically intact no gross localizing findings.  Eczema severe.  No skin lesions or stigmata of endocarditis.  No Osler's nodes or [place] lesions.    Assessment and Plan  Infective endocarditis with ongoing parenteral antibiotic therapy nafcillin every 4 hours per ID.    Gout right wrist hand improving.    Multiple drug allergies including dimethicone plus atorvastatin lisinopril and losartan.    Diabetes mellitus type 2 needs A1c lipid panel urine for microalbumin blood sugar in 2 weeks time fasting.    Time: total time spent with the patient was 40 minutes of which >50% was spent in counseling and coordination of care        Jesus Acevedo MD    Patient Active Problem List   Diagnosis     Gout     Tendonitis Of The Right Achilles Tendon     Anemia     Type 2 Diabetes Mellitus     Essential Hypertension     CKD (chronic kidney disease) stage 3, GFR 30-59 ml/min     Diabetes     NICM (nonischemic cardiomyopathy)     HTN (hypertension)     Frozen shoulder syndrome, right     Bacteremia due to Staphylococcus aureus     Recurrent colitis due to Clostridium difficile     Infected defibrillator, initial encounter     Acute bacterial endocarditis     " Hypokalemia     Infection involving implantable cardioverter-defibrillator, subsequent encounter     Infective endocarditis     Onychauxis     Anemia of chronic disease     Suspected deep tissue injury     Venous ulcer of left leg     LBBB (left bundle branch block)     Heart failure with reduced ejection fraction     ICD (implantable cardioverter-defibrillator), biventricular, in situ

## 2021-06-09 NOTE — PROGRESS NOTES
Consultation - Infectious Disease  Esteban Zambrano,  1945, MRN [id number]    [facility]  Diabetes mellitus, type 2 [E11.9]    PCP: Jesus Acevedo MD, [phone]   Code status:  full       Extended Emergency Contact Information  Primary Emergency Contact: Darren Zambrano  Address: [address] [place] [address]  Home Phone: [phone]  Mobile Phone: [phone]  Relation: Spouse  Secondary Emergency Contact: Migel Zambrano   [place]  Mobile Phone: [phone]  Relation: Child       Assessment and Plan     1. Had ICD/ AV pacer system placed   2. Prolonged mssa bacteremia . ICD removed  3. ICD/PPM replaced .  4. Recurrent MSSA bacteremia 17. ICD/PPM explanted in [place] 3/3/17.  Plan to cont abx thru .  5. Severe neutropenia on cefazolin last year.  6. On Nafcillin 2 gm q 6 hr via cadd pump thru [facility].  7. Hg 7.4, WBC 2400, and creat 2.15 on 3/7. No f/u lab in computer.  I placed a call to [facility] to get updated results.    Assessment:  1. Recurrent mssa bacteremia/ ICD infection x 2 in past 8 mos.  2. Falling wbc, hgb,and rising creat on nafcillin.    Recommendations:  1. I placed a call to [facility] to get updated results from 3/17: Hgb up to 8.0, WBC up to 4.6K, Creat down to 1.57.  Will increase Nafcillin to q 4hr.  2. Cont abx thru .  3. RTC 4 weeks.  Thanks for asking me to participate in this patients care.  Sal Brown       Chief Complaint fever       HPI   We have been requested by Dr. Menendez to evaluate Esteban Zambrano for recurent infection if ICD/PPM.  See hx above. Known to me.  Got fever to 106 on cruise ship in [facility]. Transferred to [facility]. Had ICD removed. Got on cefazolin - wbc bottomed out. Put on nafcillin. Tolerating so far but wbc down to 2400 on 3/7. No further labs availabe. .     Medical History  Active Ambulatory (Non-Hospital) Problems    Diagnosis      Heart failure with reduced ejection fraction     ICD (implantable cardioverter-defibrillator), biventricular, in situ     LBBB (left bundle branch block)     Suspected deep tissue injury     Venous ulcer of left leg     Anemia of chronic disease     Onychauxis     Infective endocarditis     Hypokalemia     Infection involving implantable cardioverter-defibrillator, subsequent encounter     Acute bacterial endocarditis     Infected defibrillator, initial encounter     Recurrent colitis due to Clostridium difficile     Frozen shoulder syndrome, right     Bacteremia due to Staphylococcus aureus     CKD (chronic kidney disease) stage 3, GFR 30-59 ml/min     Diabetes     NICM (nonischemic cardiomyopathy)     HTN (hypertension)     Gout     Tendonitis Of The Right Achilles Tendon     Anemia     Type 2 Diabetes Mellitus     Essential Hypertension     Past Medical History:   Diagnosis Date     AICD (automatic cardioverter/defibrillator) present      Anemia      Asthma      Cardiomyopathy      CKD (chronic kidney disease) stage 3, GFR 30-59 ml/min      Colitis      Diabetes      Eczema      Endocarditis due to methicillin susceptible Staphylococcus aureus (MSSA)      Gout      HTN (hypertension)      Left bundle branch block      Right shoulder pain      Tendonitis, Achilles, right     Surgical History  He  has a past surgical history that includes Vasectomy; Nose surgery; Cataract extraction; PICC Team Line Insertion (11/28/2015); Vasectomy; Cardiac pacemaker placement (Left, 04/2009); Ventricular cardiac pacer insertion (01/18/2017); and insj/rplcmt perm dfb w/trnsvns lds 1/dual chmbr (N/A, 1/18/2017).   Social History  Reviewed, and he  reports that he has never smoked. He does not have any smokeless tobacco history on file. He reports that he drinks about 0.6 oz of alcohol per week  He reports that he does not use illicit drugs.   Allergies  Allergies   Allergen Reactions     Cefazolin Other (See Comments)     Severe  "neutropenia     Sulfa (Sulfonamide Antibiotics) Anaphylaxis     Throat swelling.     Dimethicone Rash     Atorvastatin      Hair Loss     Lisinopril Unknown     Losartan Other (See Comments)     Altered vision \"white out\"    Family History  Reviewed, and family history includes Alzheimer's disease in his mother; Heart disease in his father.        Prior to Admission Medications   Current Outpatient Prescriptions on File Prior to Visit   Medication Sig Dispense Refill     ACCU-CHEK GUILLAUME PLUS TEST STRP strips TEST THREE TIMES DAILY 100 strip 0     allopurinol (ZYLOPRIM) 300 MG tablet Take 1 tablet (300 mg total) by mouth daily. 30 tablet 0     aspirin (ASPIR-81) 81 MG EC tablet Take 1 tablet (81 mg total) by mouth daily with supper. 30 tablet 0     carvedilol (COREG) 25 MG tablet Take 0.5 tablets (12.5 mg total) by mouth 2 (two) times a day with meals. 180 tablet 3     cholecalciferol, vitamin D3, 1,000 unit tablet Take 1 tablet (1,000 Units total) by mouth daily. 30 tablet 0     colchicine 0.6 mg tablet Take 1 tablet (0.6 mg total) by mouth 2 (two) times a day. 60 tablet 2     ferrous sulfate 325 (65 FE) MG tablet Take 1 tablet by mouth daily with breakfast.  0     furosemide (LASIX) 20 MG tablet Take 20 mg by mouth 2 (two) times a day at 9am and 6pm.       JANUVIA 50 mg tablet TAKE 1 TABLET BY MOUTH DAILY. 30 tablet 0     pirbuterol (MAXAIR) 200 mcg/Inhalation inhaler Inhale 2 puffs 4 (four) times a day as needed for wheezing. Prn       potassium chloride SA (K-DUR,KLOR-CON) 20 MEQ tablet Take 1 tablet (20 mEq total) by mouth 2 (two) times a day. 180 tablet 3     Saccharomyces boulardii (FLORASTOR) 250 mg capsule Take 250 mg by mouth 3 (three) times a day.       simvastatin (ZOCOR) 20 MG tablet Take 1 tablet (20 mg total) by mouth bedtime. 30 tablet 0     triamcinolone (KENALOG) 0.1 % ointment Apply topically 2 (two) times a day.       vancomycin (VANCOCIN) 250 mg/5 mL Syrg suspension Take by mouth.       " tamsulosin (FLOMAX) 0.4 mg Cp24 TAKE 1 CAPSULE BY MOUTH ONCE DAILY AFTER SUPPER 30 capsule 0     No current facility-administered medications on file prior to visit.           Review of Systems:  Review of systems:  Patient denies fever, chills, cough, sputum, vomiting, diarrhea, dysuria, urgency, flank pain, headache, stiff neck, rash, swollen glands or pain at IV sites.    Social hx, family hx reviewed and unchanged.   Physical Exam:  Vss.  Pale.  [place] of hearing (old).  Skin: no rashes or petechiae  Nodes: no cervical, axillary or inguinal adenopathy  ENT: TMs clear, oropharynx without exudate or thrush  Respiratory: normal respiratory patter, no rales or rubs.  CV: normal heart tones. No rub, murmur or S3. No JVD.  Abdmomen: soft, normal bowel sounds, non-tender, no masses or   organomegaly  Extremities: normal venous pattern. Good pulses. No edema. No joint effusions.  Neurologic: oriented x 3. Cranial nerves 2-12 intact. No focal weakness or sensory loss.          Pertinent Labs  Lab Results:personally reviewed.   Lab Results   Component Value Date     03/07/2017     01/19/2017     01/18/2017    K 4.6 03/07/2017    K 4.6 01/19/2017    K 4.6 01/18/2017    CO2 21 (L) 03/07/2017    CO2 20 (L) 01/19/2017    CO2 17 (L) 01/18/2017    BUN 35 (H) 03/07/2017    BUN 40 (H) 01/19/2017    BUN 41 (H) 01/18/2017    CREATININE 2.15 (H) 03/07/2017    CREATININE 1.48 (H) 01/19/2017    CREATININE 1.55 (H) 01/18/2017    CALCIUM 9.0 03/07/2017    CALCIUM 9.4 01/19/2017    CALCIUM 10.0 01/18/2017     Lab Results   Component Value Date    WBC 2.7 (L) 03/07/2017    WBC 7.8 01/18/2017    WBC 8.8 01/06/2017    HGB 7.4 (LL) 03/07/2017    HGB 8.7 (L) 01/18/2017    HGB 9.2 (L) 01/06/2017    HCT 22.3 (L) 03/07/2017    HCT 27.6 (L) 01/18/2017    HCT 28.1 (L) 01/06/2017    MCV 94 03/07/2017     (H) 01/18/2017    MCV 98 01/06/2017     03/07/2017     01/18/2017     01/06/2017     Lab Results    Component Value Date    ALT 22 03/07/2017    AST 30 03/07/2017    ALKPHOS 123 (H) 03/07/2017    BILITOT 0.2 03/07/2017        Pertinent Radiology  Radiology Results:none  Microbiology:noted.

## 2021-06-10 NOTE — PROGRESS NOTES
Office Visit - Follow up    Esteban Zambrano   71 y.o. male    Date of Visit: 5/3/2017    Chief Complaint   Patient presents with     Diabetes       Subjective: Beatties mellitus type II with history of endocarditis and eczema.    The patient denies high fever chills or night sweats.  No chest pain or shortness of breath feels otherwise quite well.  Not prepared for laboratory testing for diabetes today but will recheck patient with fasting office visit in about 6 weeks time wife is here very supportive.    The patient feels well his internal cardiac defibrillator and pacemaker have been explanted ×2 because of endocarditis.    No blood in stool or urine.  Medication list reviewed well-tolerated.    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  Patient anticipates revisit with cardiologist June 12, 2017.  No syncopal spells no leg edema no chest pain or shortness of breath.  At night he can sleep flat    The patient does feel chilled most of the time he has had a chronic problems with eczema blood probable portal of entry and source for his endocarditis.    ROS: A comprehensive review of systems was performed and was otherwise negative    Medications:  Prior to Admission medications    Medication Sig Start Date End Date Taking? Authorizing Provider   ACCU-CHEK GUILLAUME PLUS TEST STRP strips TEST THREE TIMES DAILY 12/23/16   Bakari Rick MD   albuterol (PROVENTIL HFA;VENTOLIN HFA) 90 mcg/actuation inhaler Inhale 1-2 puffs every 4 (four) hours as needed for wheezing. 4/6/17   Jesus Acevedo MD   allopurinol (ZYLOPRIM) 300 MG tablet Take 1 tablet (300 mg total) by mouth daily. 7/27/16   Emani King MD   aspirin (ASPIR-81) 81 MG EC tablet Take 1 tablet (81 mg total) by mouth daily with supper. 7/27/16   Emani King MD   carvedilol (COREG) 25 MG tablet Take 0.5 tablets (12.5 mg total) by mouth 2 (two) times a day with meals. 3/8/17   Jese Menendez MD   cholecalciferol, vitamin D3,  "1,000 unit tablet Take 1 tablet (1,000 Units total) by mouth daily. 7/27/16   Emani King MD   colchicine 0.6 mg tablet Take 1 tablet (0.6 mg total) by mouth 2 (two) times a day. 3/7/17 3/7/18  Jesus Acevedo MD   ferrous sulfate 325 (65 FE) MG tablet Take 1 tablet by mouth daily with breakfast. 7/27/16   Emani King MD   furosemide (LASIX) 20 MG tablet Take 20 mg by mouth 2 (two) times a day at 9am and 6pm.    PROVIDER, HISTORICAL   JANUVIA 50 mg tablet TAKE 1 TABLET BY MOUTH DAILY. 4/28/17   Jesus Acevedo MD   pirbuterol (MAXAIR) 200 mcg/Inhalation inhaler Inhale 2 puffs 4 (four) times a day as needed for wheezing. Prn    PROVIDER, HISTORICAL   potassium chloride SA (K-DUR,KLOR-CON) 20 MEQ tablet Take 1 tablet (20 mEq total) by mouth 2 (two) times a day. 10/21/16 10/21/17  Jesus Acevedo MD   Saccharomyces boulardii (FLORASTOR) 250 mg capsule Take 250 mg by mouth 2 (two) times a day.     PROVIDER, HISTORICAL   simvastatin (ZOCOR) 20 MG tablet Take 1 tablet (20 mg total) by mouth bedtime. 7/27/16   Emani King MD   tamsulosin (FLOMAX) 0.4 mg Cp24 TAKE 1 CAPSULE BY MOUTH ONCE DAILY AFTER SUPPER 4/10/17   Jesus Acevedo MD   triamcinolone (KENALOG) 0.1 % ointment Apply topically 2 (two) times a day.    PROVIDER, HISTORICAL   vancomycin (VANCOCIN) 250 mg/5 mL Syrg suspension Take by mouth.    PROVIDER, HISTORICAL       Allergies:   Allergies   Allergen Reactions     Cefazolin Other (See Comments)     Severe neutropenia     Sulfa (Sulfonamide Antibiotics) Anaphylaxis     Throat swelling.     Dimethicone Rash     Atorvastatin      Hair Loss     Lisinopril Unknown     Losartan Other (See Comments)     Altered vision \"white out\"       Immunizations:   Immunization History   Administered Date(s) Administered     DT (pediatric) 01/01/2000     Influenza [id number], 01/04/2010     Influenza, inj, historic 11/07/2007, 10/23/2008, 09/03/2015     Pneumo Conj 13-V (2010&after) 02/09/2017     Pneumo Polysac " 23-V 10/08/2009     Td, historic 01/01/2000, 07/07/2011     Tdap 07/11/2011       Exam Chest clear to auscultation and percussion.  Heart tones regular rhythm without murmur rub or gallop.  Abdomen soft nontender no organomegaly.  No peritoneal signs.  Extremities free of edema cyanosis or clubbing.  Neck veins nondistended no thyromegaly or scleral icterus noted, carotids full.  Skin warm and dry easily conversant good spirited.  Normal intelligence.  Neurologically intact no gross localizing findings.  Some seasonal allergy flareups with eczema worsening itching is back during exam.  Retching.    Assessment and Plan  Diabetes mellitus type 2 we will check A1c lipid panel blood sugar urine for microalbumin with next fasting office visit 6 weeks.    History of endocarditis staphylococcal organism.    Eczema with minor flare.    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    Multiple drug allergies including cephalosporins and sulfa dimethicone atorvastatin lisinopril and losartan.    Gout improved right wrist less swollen better handshake.    Right hand dominant.    Anemia of chronic disease.    Hyperlipidemia.    Time: total time spent with the patient was 25 minutes of which >50% was spent in counseling and coordination of care    The following high BMI interventions were performed this visit: encouragement to exercise    Jesus Acevedo MD    Patient Active Problem List   Diagnosis     Gout     Tendonitis Of The Right Achilles Tendon     Anemia     Type 2 Diabetes Mellitus     Essential Hypertension     CKD (chronic kidney disease) stage 3, GFR 30-59 ml/min     Diabetes     NICM (nonischemic cardiomyopathy)     HTN (hypertension)     Frozen shoulder syndrome, right     Bacteremia due to Staphylococcus aureus     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

## 2021-06-10 NOTE — PROGRESS NOTES
The Clinic Care Guide called the patient  today at the request of the PCP to discuss possible clinic care coordination enrollment. Clinic care coordination was described to the patient and immediate needs were discussed. The patient declined enrollment in clinic care coordination at this time. The patient was provided with contact information for the clinic care guide if their needs changed.

## 2021-06-10 NOTE — PROGRESS NOTES
Consultation - Infectious Disease  Esteban Zambrano,  1945, MRN [id number]     [facility]  Diabetes mellitus, type 2 [E11.9]     PCP: Jesus Acevedo MD, [phone]    Code status:  full       Extended Emergency Contact Information  Primary Emergency Contact: Darren Zambrano  Address: [address] [place] [address]  Home Phone: [phone]  Mobile Phone: [phone]  Relation: Spouse  Secondary Emergency Contact: Migel Zambrano  [place]  Mobile Phone: [phone]  Relation: Child      Assessment and Plan      1. Had ICD/ AV pacer system placed   2. Prolonged mssa bacteremia . ICD removed  3. ICD/PPM replaced .  4. Recurrent MSSA bacteremia 17. ICD/PPM explanted in [place] 3/3/17.  Plan to cont abx thru .  5. Severe neutropenia on cefazolin last year.  6. S/p Nafcillin 2 gm q 6 hr via cadd pump thru [place]. Completed .  7. Hg 7.4, WBC 2400, and creat 2.15 on 3/7. This stabilized and improved.   8. Seasonal allergies acting up (tree and grass pollen).    Assessment:  1. Recurrent mssa bacteremia/ ICD infection x 2 in past 8 mos.  2. Now infection resolved.   3. Dr. Menendez feels that he does not need an ICD/PPM.     Recommendations:    Sal Brown      Chief Complaint Reinfected ICD      HPI   We were  Recently requested by Dr. Menendez to evaluate Esteban Zambrano for recurent infection if ICD/PPM.  See hx above. Known to me.  Got fever to 106 on cruise ship in [facility]. Transferred to [facility]. Had ICD removed. Got on cefazolin - wbc bottomed out. Put on nafcillin. Tolerating so far but wbc down to 2400 on 3/7. No further labs availabe.      Medical History      Active Ambulatory (Non-Hospital) Problems     Diagnosis     Heart failure with reduced ejection fraction     ICD (implantable cardioverter-defibrillator), biventricular, in situ     LBBB (left bundle branch block)     Suspected deep  tissue injury     Venous ulcer of left leg     Anemia of chronic disease     Onychauxis     Infective endocarditis     Hypokalemia     Infection involving implantable cardioverter-defibrillator, subsequent encounter     Acute bacterial endocarditis     Infected defibrillator, initial encounter     Recurrent colitis due to Clostridium difficile     Frozen shoulder syndrome, right     Bacteremia due to Staphylococcus aureus     CKD (chronic kidney disease) stage 3, GFR 30-59 ml/min     Diabetes     NICM (nonischemic cardiomyopathy)     HTN (hypertension)     Gout     Tendonitis Of The Right Achilles Tendon     Anemia     Type 2 Diabetes Mellitus     Essential Hypertension           Past Medical History:   Diagnosis Date     AICD (automatic cardioverter/defibrillator) present       Anemia       Asthma       Cardiomyopathy       CKD (chronic kidney disease) stage 3, GFR 30-59 ml/min       Colitis       Diabetes       Eczema       Endocarditis due to methicillin susceptible Staphylococcus aureus (MSSA)       Gout       HTN (hypertension)       Left bundle branch block       Right shoulder pain       Tendonitis, Achilles, right       Surgical History  He  has a past surgical history that includes Vasectomy; Nose surgery; Cataract extraction; PICC Team Line Insertion (11/28/2015); Vasectomy; Cardiac pacemaker placement (Left, 04/2009); Ventricular cardiac pacer insertion (01/18/2017); and insj/rplcmt perm dfb w/trnsvns lds 1/dual chmbr (N/A, 1/18/2017). Social History  Reviewed, and he  reports that he has never smoked. He does not have any smokeless tobacco history on file. He reports that he drinks about 0.6 oz of alcohol per week He reports that he does not use illicit drugs.   Allergies        Allergies   Allergen Reactions     Cefazolin Other (See Comments)       Severe neutropenia     Sulfa (Sulfonamide Antibiotics) Anaphylaxis       Throat swelling.     Dimethicone Rash     Atorvastatin         Hair Loss      "Lisinopril Unknown     Losartan Other (See Comments)       Altered vision \"white out\"     Family History  Reviewed, and family history includes Alzheimer's disease in his mother; Heart disease in his father.        Prior to Admission Medications          Current Outpatient Prescriptions on File Prior to Visit   Medication Sig Dispense Refill     ACCU-CHEK GUILLAUME PLUS TEST STRP strips TEST THREE TIMES DAILY 100 strip 0     allopurinol (ZYLOPRIM) 300 MG tablet Take 1 tablet (300 mg total) by mouth daily. 30 tablet 0     aspirin (ASPIR-81) 81 MG EC tablet Take 1 tablet (81 mg total) by mouth daily with supper. 30 tablet 0     carvedilol (COREG) 25 MG tablet Take 0.5 tablets (12.5 mg total) by mouth 2 (two) times a day with meals. 180 tablet 3     cholecalciferol, vitamin D3, 1,000 unit tablet Take 1 tablet (1,000 Units total) by mouth daily. 30 tablet 0     colchicine 0.6 mg tablet Take 1 tablet (0.6 mg total) by mouth 2 (two) times a day. 60 tablet 2     ferrous sulfate 325 (65 FE) MG tablet Take 1 tablet by mouth daily with breakfast.   0     furosemide (LASIX) 20 MG tablet Take 20 mg by mouth 2 (two) times a day at 9am and 6pm.         JANUVIA 50 mg tablet TAKE 1 TABLET BY MOUTH DAILY. 30 tablet 0     pirbuterol (MAXAIR) 200 mcg/Inhalation inhaler Inhale 2 puffs 4 (four) times a day as needed for wheezing. Prn         potassium chloride SA (K-DUR,KLOR-CON) 20 MEQ tablet Take 1 tablet (20 mEq total) by mouth 2 (two) times a day. 180 tablet 3     Saccharomyces boulardii (FLORASTOR) 250 mg capsule Take 250 mg by mouth 3 (three) times a day.         simvastatin (ZOCOR) 20 MG tablet Take 1 tablet (20 mg total) by mouth bedtime. 30 tablet 0     triamcinolone (KENALOG) 0.1 % ointment Apply topically 2 (two) times a day.         vancomycin (VANCOCIN) 250 mg/5 mL Syrg suspension Take by mouth.         tamsulosin (FLOMAX) 0.4 mg Cp24 TAKE 1 CAPSULE BY MOUTH ONCE DAILY AFTER SUPPER 30 capsule 0      No current " facility-administered medications on file prior to visit.              Review of Systems:  Review of systems:  Patient denies fever, chills, cough, sputum, vomiting, diarrhea, dysuria, urgency, flank pain, headache, stiff neck, rash, swollen glands or pain at IV sites.     Social hx, family hx reviewed and unchanged.    Physical Exam:  Vss.  Pale.  Schleicher of hearing (old).  Skin: no rashes or petechiae  Nodes: no cervical, axillary or inguinal adenopathy  ENT: TMs clear, oropharynx without exudate or thrush  Respiratory: normal respiratory patter, no rales or rubs.  CV: normal heart tones. No rub, murmur or S3. No JVD.  Abdmomen: soft, normal bowel sounds, non-tender, no masses or   organomegaly  Extremities: normal venous pattern. Good pulses. No edema. No joint effusions.  Neurologic: oriented x 3. Cranial nerves 2-12 intact. No focal weakness or sensory loss.

## 2021-06-11 NOTE — PROGRESS NOTES
Office Visit - Follow up    Esteban Zambrano   71 y.o. male    Date of Visit: 6/12/2017    Chief Complaint   Patient presents with     Diabetes     Hypertension     Chronic Kidney Disease       Subjective: diabetes mellitus type II fasting.    Did have yogurt this morning.    Last lab test done April 6, 2017.    Diabetic foot examination done all clear.    Hypertension also here in follow-up.    Multiple drug allergies including dimethicone atorvastatin lisinopril and losartan.    The patient denies any fever or chills no chest pain shortness of breath no blood in stool or urine.    He has had a history of endocarditis and has had multiple surgeries and prolonged in about antibiotic courses of treatment for same.    He no longer has an implantable ICD pacemaker.    Underlying cardiomyopathy nonischemic plus diabetes mellitus type 2.  He denies polyuria or polydipsia.    Medication list reviewed generally well-tolerated.    ROS: A comprehensive review of systems was performed and was otherwise negative    Medications:  Prior to Admission medications    Medication Sig Start Date End Date Taking? Authorizing Provider   ACCU-CHEK GUILLAUME PLUS TEST STRP strips TEST THREE TIMES DAILY 5/19/17  Yes Jesus Acevedo MD   albuterol (PROVENTIL HFA;VENTOLIN HFA) 90 mcg/actuation inhaler Inhale 1-2 puffs every 4 (four) hours as needed for wheezing. 4/6/17  Yes Jesus Acevedo MD   allopurinol (ZYLOPRIM) 300 MG tablet TAKE ONE TABLET BY MOUTH ONE TIME DAILY 5/28/17  Yes Jesus Acevedo MD   aspirin (ASPIR-81) 81 MG EC tablet Take 1 tablet (81 mg total) by mouth daily with supper. 7/27/16  Yes Emani King MD   carvedilol (COREG) 12.5 MG tablet Take 1.5 tablets (18.75 mg total) by mouth 2 (two) times a day with meals. 5/12/17  Yes Bakari Santiago MD   ferrous sulfate 325 (65 FE) MG tablet Take 1 tablet by mouth daily with breakfast. 7/27/16  Yes Emani King MD   furosemide (LASIX) 40 MG tablet Take 0.5 tablets by  "mouth 2 (two) times a day. 5/4/17  Yes PROVIDER, HISTORICAL   JANUVIA 50 mg tablet TAKE 1 TABLET BY MOUTH DAILY. 5/25/17  Yes Jesus Acevedo MD   potassium chloride SA (K-DUR,KLOR-CON) 20 MEQ tablet Take 1 tablet (20 mEq total) by mouth 2 (two) times a day. 10/21/16 10/21/17 Yes Jesus Acevedo MD   Saccharomyces boulardii (FLORASTOR) 250 mg capsule Take 250 mg by mouth 2 (two) times a day.    Yes PROVIDER, HISTORICAL   simvastatin (ZOCOR) 20 MG tablet Take 1 tablet (20 mg total) by mouth bedtime. 7/27/16  Yes Emani King MD   tamsulosin (FLOMAX) 0.4 mg Cp24 TAKE 1 CAPSULE BY MOUTH ONCE DAILY AFTER SUPPER 6/4/17  Yes Jesus Acevedo MD   triamcinolone (KENALOG) 0.1 % ointment Apply topically 2 (two) times a day.   Yes PROVIDER, HISTORICAL   cholecalciferol, vitamin D3, 1,000 unit tablet Take 1 tablet (1,000 Units total) by mouth daily. 7/27/16   Emani King MD   colchicine 0.6 mg tablet Take 1 tablet (0.6 mg total) by mouth 2 (two) times a day. 3/7/17 6/12/17  Jesus Acevedo MD   furosemide (LASIX) 20 MG tablet Take 20 mg by mouth 2 (two) times a day at 9am and 6pm.  6/12/17  PROVIDER, HISTORICAL   pirbuterol (MAXAIR) 200 mcg/Inhalation inhaler Inhale 2 puffs 4 (four) times a day as needed for wheezing. Prn  6/12/17  PROVIDER, HISTORICAL   vancomycin (VANCOCIN) 250 mg/5 mL Syrg suspension Take by mouth.  6/12/17  PROVIDER, HISTORICAL       Allergies:   Allergies   Allergen Reactions     Cefazolin Other (See Comments)     Severe neutropenia     Sulfa (Sulfonamide Antibiotics) Anaphylaxis     Throat swelling.     Dimethicone Rash     Atorvastatin      Hair Loss     Lisinopril Unknown     Losartan Other (See Comments)     Altered vision \"white out\"       Immunizations:   Immunization History   Administered Date(s) Administered     DT (pediatric) 01/01/2000     Influenza [id number], 01/04/2010     Influenza, inj, historic 11/07/2007, 10/23/2008, 09/03/2015     Pneumo Conj 13-V (2010&after) 02/09/2017 "     Pneumo Polysac 23-V 10/08/2009     Td, historic 01/01/2000, 07/07/2011     Tdap 07/11/2011       Exam Chest clear to auscultation and percussion.  Heart tones regular rhythm without murmur rub or gallop.  Abdomen soft nontender no organomegaly.  No peritoneal signs.  Extremities free of edema cyanosis or clubbing.  Neck veins nondistended no thyromegaly or scleral icterus noted, carotids full.  Skin warm and dry easily conversant good spirited.  Normal intelligence.  Neurologically intact no gross localizing findings.  No neck vein distention eczematous changes on his hands recent dermatology consultation for biopsy with sutures in place right forearm dorsal medial aspect.  Well-healed not draining not secondarily infected.    Assessment and Plan  Diabetes mellitus type 2 check potassium plus A1c blood sugar urine for microalbumin and lipid panel today.    Multiple drug allergies see above.    Hypokalemia on potassium supplement.    Nonischemic cardiomyopathy currently euvolemic without evidence for significant dysrhythmia.    Gout asymptomatic at this juncture.    Eczema severe with recent dermatology consultation for same and surgery skin surgery for same with sutures in place not secondarily infected.    History of CHF currently euvolemic reemphasized salt restriction and diet.    History of endocarditis status post explantation of permanent pacemaker ICD previously ×2.  Followed by cardiology as well as previously infectious disease and multiple prior prolonged antibiotic courses of treatment parenterally administered.  Complicated by C. difficile toxin colitis.  Pseudomembranous colitis.    Time: total time spent with the patient was 25 minutes of which >50% was spent in counseling and coordination of care        Jesus Acevedo MD    Patient Active Problem List   Diagnosis     Gout     Tendonitis Of The Right Achilles Tendon     Anemia     Type 2 Diabetes Mellitus     Essential Hypertension     CKD  (chronic kidney disease) stage 3, GFR 30-59 ml/min     Diabetes     NICM (nonischemic cardiomyopathy)     HTN (hypertension)     Frozen shoulder syndrome, right     Bacteremia due to Staphylococcus aureus     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     Cardiomyopathy     Cardiomyopathy, dilated     Non-rheumatic mitral regurgitation

## 2021-06-12 NOTE — PROGRESS NOTES
Office Visit - Follow up    Esteban Zambrano   71 y.o. male    Date of Visit: 7/27/2017    Chief Complaint   Patient presents with     Diabetes     Chronic Kidney Disease     Hypertension     Alopecia       Subjective: Diabetes mellitus type 2 with chronic kidney disease and hypertension.    Patient has had hair loss which he ascribes to atorvastatin.  Will stop the statin and recheck lipids 1 month fasting.  Previously on atorvastatin and developed hair loss.  Now on simvastatin has developed hair loss again.  Accompanied by his wife who is very supportive Mischel.    The patient denies fevers chills night sweats history of endocarditis and infected pacemaker and defibrillator related to underlying nonischemic cardiomyopathy.  History of long-standing eczema skin is bothersome now more problematic.  Recent skin surgery for skin cancer anterior tibial surface right lower extremity distally.    No blood in stool or urine medication list reviewed well-tolerated normal effects.  Except for atorvastatin.  And/or simvastatin which is causing the hair loss according to the patient and his wife who is accompanies him here today and is very supportive.  Laboratory tests reviewed from June 12, 2017.    ROS: A comprehensive review of systems was performed and was otherwise negative    Medications:  Prior to Admission medications    Medication Sig Start Date End Date Taking? Authorizing Provider   albuterol (PROVENTIL HFA;VENTOLIN HFA) 90 mcg/actuation inhaler Inhale 1-2 puffs every 4 (four) hours as needed for wheezing. 4/6/17  Yes Jesus Acevedo MD   allopurinol (ZYLOPRIM) 300 MG tablet TAKE ONE TABLET BY MOUTH ONE TIME DAILY 5/28/17  Yes Jesus Acevedo MD   aspirin (ASPIR-81) 81 MG EC tablet Take 1 tablet (81 mg total) by mouth daily with supper. 7/27/16  Yes Emani King MD   carvedilol (COREG) 12.5 MG tablet Take 1.5 tablets (18.75 mg total) by mouth 2 (two) times a day with meals. 5/12/17  Yes Bakari GEORGES  "MD Jack   cholecalciferol, vitamin D3, 1,000 unit tablet Take 1 tablet (1,000 Units total) by mouth daily. 7/27/16  Yes Emani King MD   ferrous sulfate 325 (65 FE) MG tablet Take 1 tablet by mouth daily with breakfast. 7/27/16  Yes Emani King MD   furosemide (LASIX) 40 MG tablet Take 0.5 tablets by mouth 2 (two) times a day. 5/4/17  Yes PROVIDER, HISTORICAL   potassium chloride SA (K-DUR,KLOR-CON) 20 MEQ tablet Take 1 tablet (20 mEq total) by mouth 2 (two) times a day. 10/21/16 10/21/17 Yes Jesus Acevedo MD   Saccharomyces boulardii (FLORASTOR) 250 mg capsule Take 250 mg by mouth 2 (two) times a day.    Yes PROVIDER, HISTORICAL   simvastatin (ZOCOR) 20 MG tablet Take 1 tablet (20 mg total) by mouth at bedtime. 7/3/17  Yes Jesus Acevedo MD   sitaGLIPtin (JANUVIA) 50 MG tablet TAKE 1 TABLET BY MOUTH DAILY. 7/3/17  Yes Jesus Acevedo MD   tamsulosin (FLOMAX) 0.4 mg Cp24 TAKE 1 CAPSULE BY MOUTH ONCE DAILY AFTER SUPPER 7/5/17  Yes Jesus Acevedo MD   triamcinolone (KENALOG) 0.1 % ointment Apply topically 2 (two) times a day.   Yes PROVIDER, HISTORICAL   ACCU-CHEK GUILLAUME PLUS TEST STRP strips TEST THREE TIMES DAILY AS DIRECTED 7/22/17   Jesus Acevedo MD       Allergies:   Allergies   Allergen Reactions     Cefazolin Other (See Comments)     Severe neutropenia     Sulfa (Sulfonamide Antibiotics) Anaphylaxis     Throat swelling.     Dimethicone Rash     Atorvastatin      Hair Loss     Lisinopril Unknown     Losartan Other (See Comments)     Altered vision \"white out\"       Immunizations:   Immunization History   Administered Date(s) Administered     DT (pediatric) 01/01/2000     Influenza [id number], 01/04/2010     Influenza, inj, historic 11/07/2007, 10/23/2008, 09/03/2015     Pneumo Conj 13-V (2010&after) 02/09/2017     Pneumo Polysac 23-V 10/08/2009     Td, historic 01/01/2000, 07/07/2011     Tdap 07/11/2011       Exam Chest clear to auscultation and percussion.  Heart tones regular " rhythm without murmur rub or gallop.  Abdomen soft nontender no organomegaly.  No peritoneal signs.  Extremities free of edema cyanosis or clubbing.  Neck veins nondistended no thyromegaly or scleral icterus noted, carotids full.  Skin warm and dry easily conversant good spirited.  Normal intelligence.  Neurologically intact no gross localizing findings.  Eczema seems more problematic.  Lifelong problem.  Source of infection for endocarditis and removal of pacemaker defibrillator.  ×2.  Previously seen by Dr. RENETTA Menendez cardiac electrophysiologist as well as infectious disease specialty team.    Assessment and Plan  Eczema severe.    Diabetes mellitus type 2.  Labs last on June 12, 2017 will check fasting labs to include basic metabolic profile A1c plus lipid panel urine for microalbumin 1 month's time.    Alopecia secondary to statin therapy.  Previously atorvastatin caused alopecia.  Currently on simvastatin will hold for 1 month.    [place] for referral to podiatry service Dr. Sylvester Hopkins presiding.    Hypertension with chronic kidney disease and history of gout labs to be checked again in 1 month fasting include uric acid as well as those outlined above.  RTC 1 month office visit with fasting labs.    Time: total time spent with the patient was 25 minutes of which >50% was spent in counseling and coordination of care    The following high BMI interventions were performed this visit: encouragement to exercise    Jesus Acevedo MD    Patient Active Problem List   Diagnosis     Gout     Tendonitis Of The Right Achilles Tendon     Anemia     Type 2 Diabetes Mellitus     Essential Hypertension     CKD (chronic kidney disease) stage 3, GFR 30-59 ml/min     Diabetes     NICM (nonischemic cardiomyopathy)     HTN (hypertension)     Frozen shoulder syndrome, right     Bacteremia due to Staphylococcus aureus     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     Cardiomyopathy     Cardiomyopathy, dilated     Non-rheumatic mitral regurgitation

## 2021-06-15 PROBLEM — Z95.810 ICD (IMPLANTABLE CARDIOVERTER-DEFIBRILLATOR), BIVENTRICULAR, IN SITU: Status: ACTIVE | Noted: 2017-01-01

## 2021-06-15 PROBLEM — I34.0 NON-RHEUMATIC MITRAL REGURGITATION: Status: ACTIVE | Noted: 2017-01-01

## 2021-06-15 PROBLEM — I42.0 CARDIOMYOPATHY, DILATED (H): Status: ACTIVE | Noted: 2017-01-01

## 2021-06-15 PROBLEM — I42.9 CARDIOMYOPATHY (H): Status: ACTIVE | Noted: 2017-01-01

## 2021-06-15 PROBLEM — I50.20 HEART FAILURE WITH REDUCED EJECTION FRACTION (H): Status: ACTIVE | Noted: 2017-01-01

## 2021-06-25 NOTE — PROGRESS NOTES
Progress Notes by Bakari Santiago MD at 1/26/2017  1:10 PM     Author: Bakari Santiago MD Service: -- Author Type: Physician    Filed: 1/26/2017  1:46 PM Encounter Date: 1/26/2017 Status: Signed    : Bakari Santiago MD (Physician)           Click to link to [facility] HEART [facility] NOTE    Thank you, Dr. Acevedo, for asking us to see Esteban Zambrano at the [facility].      Assessment/Recommendations   Patient is stable from a functional capacity standpoint after recent biventricular pacing.  I would be hopeful that once again with biventricular pacing that his left ventricular systolic function would gradually improve.  We will recheck a echocardiogram in about 6 months.    I have not changed his medications today.  He will call if he has changes in his functional capacity.         History of Present Illness    Mr. Esteban Zambrano is a 71 y.o. male with known cardiomyopathy of undetermined etiology and a left bundle branch block pattern.  He had difficulty with device infection and it was all removed and we followed his left ventricular systolic function and mitral insufficiency.  The left ventricular systolic function deteriorated the mitral insufficiency worsened over time without the biventricular pacing device.  He had had a dramatic improvement in his left ventricular ejection function prior with device therapy.    He had a repeat biventricular pacing device implanted about a week ago.  The implantation went well.  He is not feeling any shortness of breath, orthopnea, paroxysmal nocturnal dyspnea, peripheral edema, syncope or near syncope and denies any chest pain.             Physical Examination Review of Systems   Vitals:    01/26/17 1312   BP: 114/68   Pulse: 76   Resp: 16     Body mass index is 21.97 kg/(m^2).  Wt Readings from Last 3 Encounters:   01/26/17 162 lb (73.5 kg)   01/25/17 162 lb (73.5 kg)   01/19/17 158 lb 12.8 oz (72 kg)      General Appearance:   Alert, cooperative and in no acute distress.   ENT/Mouth: Oral mucuos membranes pink and moist .      EYES:  No scleral icterus. No Xanthelasma.    Neck: JVP normal. No Hepatojugular reflux. Thyroid not visualized   Chest/Lungs:   Lungs are clear to auscultation, equal chest wall expansion    Cardiovascular:   S1, S2 with 2/6 systolic murmur , no clicks or rubs. Brachial, radial  pulses are intact and symetric. No carotid bruits noted   Abdomen:  Nontender. BS+. No bruits.      Extremities: No cyanosis, clubbing or edema   Skin: no xanthelasma, warm.    Psych: Appropriate affect.   Neurologic: normal gait, normal  bilateral, no tremors        General: WNL  Eyes: WNL  Ears/Nose/Throat: WNL  Lungs: WNL  Heart: WNL  Stomach: WNL  Bladder: WNL  Muscle/Joints: WNL  Skin: WNL  Nervous System: WNL  Mental Health: WNL     Blood: WNL     Medical History  Surgical History Family History Social History   Past Medical History   Diagnosis Date   ? AICD (automatic cardioverter/defibrillator) present    ? Anemia    ? Asthma    ? Cardiomyopathy    ? CKD (chronic kidney disease) stage 3, GFR 30-59 ml/min    ? Colitis    ? Diabetes    ? Eczema    ? Endocarditis due to methicillin susceptible Staphylococcus aureus (MSSA)    ? Gout    ? HTN (hypertension)    ? Left bundle branch block    ? Right shoulder pain      chronic   ? Tendonitis, Achilles, right     Past Surgical History   Procedure Laterality Date   ? Vasectomy     ? Nose surgery     ? Cataract extraction     ? Picc and midline team line insertion  11/28/2015         ? Vasectomy     ? Cardiac pacemaker placement Left 04/2009     AICD   ? Ventricular cardiac pacemaker insertion  01/18/2017     Biventricular   ? Pr insj/rplcmt perm dfb w/trnsvns lds 1/dual chmbr N/A 1/18/2017     Procedure: EP ICD Insert;  Surgeon: Jese Menendez MD;  Location: [facility] Cath Lab;  Service: Cardiology    Family History   Problem Relation Age of Onset   ?  Alzheimer's disease Mother    ? Heart disease Father     Social History     Social History   ? Marital status:      Spouse name: N/A   ? Number of children: N/A   ? Years of education: N/A     Occupational History   ? Not on file.     Social History Main Topics   ? Smoking status: Never Smoker   ? Smokeless tobacco: Not on file   ? Alcohol use 0.6 oz/week     1 Cans of beer per week      Comment: occasional   ? Drug use: No   ? Sexual activity: Yes     Partners: Female     Other Topics Concern   ? Not on file     Social History Narrative          Medications  Allergies   Current Outpatient Prescriptions   Medication Sig Dispense Refill   ? ACCU-CHEK GUILLAUME PLUS TEST STRP strips TEST THREE TIMES DAILY 100 strip 0   ? allopurinol (ZYLOPRIM) 300 MG tablet Take 1 tablet (300 mg total) by mouth daily. 30 tablet 0   ? aspirin (ASPIR-81) 81 MG EC tablet Take 1 tablet (81 mg total) by mouth daily with supper. 30 tablet 0   ? carvedilol (COREG) 25 MG tablet Take 1 tablet (25 mg total) by mouth 2 (two) times a day with meals. 180 tablet 3   ? cholecalciferol, vitamin D3, 1,000 unit tablet Take 1 tablet (1,000 Units total) by mouth daily. 30 tablet 0   ? ferrous sulfate 325 (65 FE) MG tablet Take 1 tablet by mouth daily with breakfast.  0   ? furosemide (LASIX) 20 MG tablet Take 20 mg by mouth 2 (two) times a day at 9am and 6pm.     ? JANUVIA 50 mg tablet TAKE 1 TABLET BY MOUTH DAILY. 30 tablet 0   ? pirbuterol (MAXAIR) 200 mcg/Inhalation inhaler Inhale 2 puffs 4 (four) times a day as needed for wheezing. Prn     ? potassium chloride SA (K-DUR,KLOR-CON) 20 MEQ tablet Take 1 tablet (20 mEq total) by mouth 2 (two) times a day. 180 tablet 3   ? simvastatin (ZOCOR) 20 MG tablet Take 1 tablet (20 mg total) by mouth bedtime. 30 tablet 0   ? tamsulosin (FLOMAX) 0.4 mg Cp24 TAKE 1 CAPSULE BY MOUTH ONCE DAILY AFTER SUPPER 30 capsule 0   ? triamcinolone (KENALOG) 0.1 % ointment Apply topically 2 (two) times a day.       No current  "facility-administered medications for this visit.       Allergies   Allergen Reactions   ? Cefazolin Other (See Comments)     Severe neutropenia   ? Sulfa (Sulfonamide Antibiotics) Anaphylaxis     Throat swelling.   ? Dimethicone Rash   ? Atorvastatin      Hair Loss   ? Lisinopril Unknown   ? Losartan Other (See Comments)     Altered vision \"white out\"         Lab Results    Chemistry/lipid CBC Cardiac Enzymes/BNP/TSH/INR   Lab Results   Component Value Date    CHOL 146 12/19/2016    HDL 45 12/19/2016    LDLCALC 72 12/19/2016    TRIG 144 12/19/2016    CREATININE 1.48 (H) 01/19/2017    BUN 40 (H) 01/19/2017    K 4.6 01/19/2017     01/19/2017     (H) 01/19/2017    CO2 20 (L) 01/19/2017    Lab Results   Component Value Date    WBC 7.8 01/18/2017    HGB 8.7 (L) 01/18/2017    HCT 27.6 (L) 01/18/2017     (H) 01/18/2017     01/18/2017    Lab Results   Component Value Date    CKTOTAL 184 01/03/2016    CKMB 6 01/03/2016    TROPONINI 0.05 07/15/2016     (H) 07/15/2016    TSH 6.56 (H) 12/19/2016    INR 1.20 (H) 06/29/2016                                                                   "

## 2021-06-25 NOTE — PROGRESS NOTES
Progress Notes by Bakari Santiago MD at 5/12/2017  2:30 PM     Author: Bakari Santiago MD Service: -- Author Type: Physician    Filed: 5/12/2017  2:58 PM Encounter Date: 5/12/2017 Status: Signed    : Bakari Santiago MD (Physician)           Click to link to [facility] HEART CARE NOTE    Thank you, Dr. Acevedo, for asking us to see Esteban Zambrano at the [facility].      Assessment/Recommendations   Patient with known cardiomyopathy who benefited from biventricular pacing from an ejection fraction standpoint.  He just cannot tolerate these devices in his vascular tree and likely because of his chronic skin condition and intermittent ulcerations of his skin and bacteremia.  We will not encouraged him to ever put a device in again and this would probably be a problem for any type of surgery for mitral insufficiency given that he would need a ring and some foreign material.    I would like to try to increase his carvedilol a bit to 18.75 twice daily and he will call if his blood pressures begin to get lower.  We will get an echocardiogram in September and I will see him thereafter.    Thank you for allowing us to participate in his care.         History of Present Illness    Mr. Esteban Zambrano is a 71 y.o. male known history of cardiomyopathy and significant mitral insufficiency.  He did very well for years on medical therapy but then had a reduction in his left ventricular systolic function.  Biventricular pacing and ICD improved his ejection fraction and actually improved his functional capacity.  He had an infection in the device and the need to be removed and subsequently his ejection fraction went down and his functional capacity also went down.  He did have a repeat implantation of a biventricular device with ICD and unfortunately had another device infection while on a trip in the [facility].  He had to be airlifted to [place] on a jet and the  device was removed once again.  He is actually been feeling pretty well.  His breathing is been comfortable.  He denies orthopnea, paroxysmal nocturnal dyspnea or peripheral edema.  He has not had syncopal or near syncopal episodes.  His blood blood pressure was a bit low so Dr. Menendez reduce his carvedilol from 25 twice daily to 12.5 twice daily.  He overall feels like he is doing well.         Physical Examination Review of Systems   Vitals:    05/12/17 1431   BP: 108/74   Pulse: 80   Resp: 16     Body mass index is 22.32 kg/(m^2).  Wt Readings from Last 3 Encounters:   05/12/17 160 lb (72.6 kg)   05/03/17 156 lb (70.8 kg)   04/06/17 163 lb (73.9 kg)     General Appearance:   Alert, cooperative and in no acute distress.   ENT/Mouth: Oral mucuos membranes pink and moist .      EYES:  No scleral icterus. No Xanthelasma.    Neck: JVP normal. No Hepatojugular reflux. Thyroid not visualized   Chest/Lungs:   Lungs are clear to auscultation, equal chest wall expansion    Cardiovascular:   S1, S2 with 3/6 systolic murmur , no clicks or rubs. Brachial, radial pulses are intact and symetric. No carotid bruits noted   Abdomen:  Nontender. BS+. No bruits.      Extremities: No cyanosis, clubbing or edema   Skin: no xanthelasma, warm.    Psych: Appropriate affect.   Neurologic: normal gait, normal  bilateral, no tremors        General: WNL  Eyes: WNL  Ears/Nose/Throat: WNL  Lungs: WNL  Heart: WNL  Stomach: WNL  Bladder: WNL  Muscle/Joints: WNL  Skin: WNL  Nervous System: WNL  Mental Health: WNL     Blood: WNL     Medical History  Surgical History Family History Social History   Past Medical History:   Diagnosis Date   ? AICD (automatic cardioverter/defibrillator) present    ? Anemia    ? Asthma    ? Cardiomyopathy    ? CKD (chronic kidney disease) stage 3, GFR 30-59 ml/min    ? Colitis    ? Diabetes    ? Eczema    ? Endocarditis due to methicillin susceptible Staphylococcus aureus (MSSA)    ? Gout    ? HTN (hypertension)     ? Left bundle branch block    ? Right shoulder pain     chronic   ? Tendonitis, Achilles, right     Past Surgical History:   Procedure Laterality Date   ? CARDIAC PACEMAKER PLACEMENT Left 04/2009    AICD   ? CATARACT EXTRACTION     ? NOSE SURGERY     ? PICC AND MIDLINE TEAM LINE INSERTION  11/28/2015        ? FL INSJ/RPLCMT PERM DFB W/TRNSVNS LDS 1/DUAL CHMBR N/A 1/18/2017    Procedure: EP ICD Insert;  Surgeon: Jese Menendez MD;  Location: [facility] Cath Lab;  Service: Cardiology   ? VASECTOMY     ? VASECTOMY     ? VENTRICULAR CARDIAC PACEMAKER INSERTION  01/18/2017    Biventricular    Family History   Problem Relation Age of Onset   ? Alzheimer's disease Mother    ? Heart disease Father     Social History     Social History   ? Marital status:      Spouse name: N/A   ? Number of children: N/A   ? Years of education: N/A     Occupational History   ? Not on file.     Social History Main Topics   ? Smoking status: Never Smoker   ? Smokeless tobacco: Not on file   ? Alcohol use 0.6 oz/week     1 Cans of beer per week      Comment: occasional   ? Drug use: No   ? Sexual activity: Yes     Partners: Female     Other Topics Concern   ? Not on file     Social History Narrative          Medications  Allergies   Current Outpatient Prescriptions   Medication Sig Dispense Refill   ? ACCU-CHEK GUILLAUME PLUS TEST STRP strips TEST THREE TIMES DAILY 100 strip 0   ? albuterol (PROVENTIL HFA;VENTOLIN HFA) 90 mcg/actuation inhaler Inhale 1-2 puffs every 4 (four) hours as needed for wheezing. 1 Inhaler 11   ? allopurinol (ZYLOPRIM) 300 MG tablet Take 1 tablet (300 mg total) by mouth daily. 30 tablet 0   ? aspirin (ASPIR-81) 81 MG EC tablet Take 1 tablet (81 mg total) by mouth daily with supper. 30 tablet 0   ? cholecalciferol, vitamin D3, 1,000 unit tablet Take 1 tablet (1,000 Units total) by mouth daily. 30 tablet 0   ? ferrous sulfate 325 (65 FE) MG tablet Take 1 tablet by mouth daily with breakfast.  0   ? furosemide  "(LASIX) 40 MG tablet Take 0.5 tablets by mouth 2 (two) times a day.  3   ? JANUVIA 50 mg tablet TAKE 1 TABLET BY MOUTH DAILY. 30 tablet 0   ? potassium chloride SA (K-DUR,KLOR-CON) 20 MEQ tablet Take 1 tablet (20 mEq total) by mouth 2 (two) times a day. 180 tablet 3   ? Saccharomyces boulardii (FLORASTOR) 250 mg capsule Take 250 mg by mouth 2 (two) times a day.      ? simvastatin (ZOCOR) 20 MG tablet Take 1 tablet (20 mg total) by mouth bedtime. 30 tablet 0   ? tamsulosin (FLOMAX) 0.4 mg Cp24 TAKE 1 CAPSULE BY MOUTH ONCE DAILY AFTER SUPPER 30 capsule 0   ? triamcinolone (KENALOG) 0.1 % ointment Apply topically 2 (two) times a day.     ? carvedilol (COREG) 12.5 MG tablet Take 1.5 tablets (18.75 mg total) by mouth 2 (two) times a day with meals. 180 tablet 3   ? colchicine 0.6 mg tablet Take 1 tablet (0.6 mg total) by mouth 2 (two) times a day. 60 tablet 2   ? furosemide (LASIX) 20 MG tablet Take 20 mg by mouth 2 (two) times a day at 9am and 6pm.     ? pirbuterol (MAXAIR) 200 mcg/Inhalation inhaler Inhale 2 puffs 4 (four) times a day as needed for wheezing. Prn     ? vancomycin (VANCOCIN) 250 mg/5 mL Syrg suspension Take by mouth.       No current facility-administered medications for this visit.       Allergies   Allergen Reactions   ? Cefazolin Other (See Comments)     Severe neutropenia   ? Sulfa (Sulfonamide Antibiotics) Anaphylaxis     Throat swelling.   ? Dimethicone Rash   ? Atorvastatin      Hair Loss   ? Lisinopril Unknown   ? Losartan Other (See Comments)     Altered vision \"white out\"         Lab Results    Chemistry/lipid CBC Cardiac Enzymes/BNP/TSH/INR   Lab Results   Component Value Date    CHOL 162 04/06/2017    HDL 31 (L) 04/06/2017    LDLCALC 96 04/06/2017    TRIG 173 (H) 04/06/2017    CREATININE 2.15 (H) 03/07/2017    BUN 35 (H) 03/07/2017    K 4.6 03/07/2017     03/07/2017     (H) 03/07/2017    CO2 21 (L) 03/07/2017    Lab Results   Component Value Date    WBC 2.7 (L) 03/07/2017    HGB " 7.4 (LL) 03/07/2017    HCT 22.3 (L) 03/07/2017    MCV 94 03/07/2017     03/07/2017    Lab Results   Component Value Date    CKTOTAL 184 01/03/2016    CKMB 6 01/03/2016    TROPONINI 0.05 07/15/2016     (H) 07/15/2016    TSH 6.56 (H) 12/19/2016    INR 1.20 (H) 06/29/2016

## 2021-06-25 NOTE — PROGRESS NOTES
Progress Notes by eJse Menendez MD at 3/8/2017  1:50 PM     Author: Jese Menendez MD Service: -- Author Type: Physician    Filed: 3/9/2017  3:51 PM Encounter Date: 3/8/2017 Status: Signed    : Jese Menendez MD (Physician)           Click to link to [facility] HEART [facility] ELECTROPHYSIOLOGY NOTE    Today I had the opportunity to see  Esteban Zambrano for follow-up evaluation of recurrent MSSA endocarditis involving ICD leads.      Assessment/Recommendations   Clinic Problem List:  1. Bacteremia due to Staphylococcus aureus     2. NICM (nonischemic cardiomyopathy)  carvedilol (COREG) 25 MG tablet   3. CKD (chronic kidney disease) stage 3, GFR 30-59 ml/min     4. 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  I suspect this is related to his chronic diabetes and eczema.  Removal of ICD system again was indicated.   No current gross infection evidence for infection.  Nonischemic cardiomyopathy with moderate decrease in left ventricular function and moderately severe mitral insufficiency.  Given recurrent sepsis I would not recommend implantation of an endocardial device despite mitral insufficiency and depressed left ventricular function.  Carvedilol was decreased to 12.5 mg twice daily due to hypotension.  LifeVest was discontinued given her history of ICD therapies for VT over 7 years.  Plan:  Decrease carvedilol to 12.5 mg twice a day  Discontinue LifeVest  Follow-up with Dr. Brown as scheduled  No plans to implant ICD or CRT device  Follow up appointment:   Dr. Bakari Santiago in 2 months       History of Present Illness     Esteban Zambrano is a 71 y.o. male with nonischemic cardiomyopathy and left bundle branch block. I implanted a CRT-D defibrillator in April 2009. At that time left ventricular function had decreased from 30% in 1998-15-20%  and he had functional class II congestive heart failure with dyspnea on exertion. Moderately severe mitral insufficiency was also at that time. Ejection fraction increased to 20-30% by December 2009 in July 2014 it was 45% and in July 2016 it was 50-55% with what was felt to be moderate mitral insufficiency.  In July 2016 his CRT-D was removed due to evidence for MSSA endocarditis with recurrent bacteremia and vegetations seen on transesophageal echo. There was heavy calcification at the subclavian vein at the RA SVC junction but all 3 leads were removed successfully. Source of infection was not clear but patient felt that it was related to a traumatic injury to his right elbow. He has long-standing eczema. Notes indicate also history of Pseudomonas bacteremia related to colitis several months earlier. He does have type 2 diabetes, history of hypertension and allergies both to lisinopril and losartan. His echocardiogram 11/21/2016 shows ejection fraction 35-40% moderate left atrial enlargement moderate to severe mitral insufficiency and RV pressures of 50 mmHg. It was felt that mitral insufficiency and RV pressures were worse than the October 2016 echo. He had never received ICD therapies from his old device.    CRT-D reimplantation was recommended given worsening mitral insufficiency and worsening left ventricular function.  This was performed on January 18, 2017 at [facility].  Preoperative white blood cell count was 7800 hemoglobin 8.7 and BUN of 41 creatinine 1.5.  He tolerated the procedure well but left ventricular lead placement was technically difficult.  He did well for 1 month but felt gastroenteritis on a cruise in the [place] and was subsequently had a fever to 106. He was hospitalized in [facility] and blood cultures were positive for MSSA.  He was transferred to [facility] in [place].  Echocardiogram showed an ejection fraction of 30-35% and CLAUDIA showed vegetations on the ICD lead.  The  ICD system was removed on 2/24/2017.  He was treated with nafcillin and clindamycin.  A LifeVest was placed.  Today he describes feeling weak.  He denies fever or chills.  He is complaining of pain in his right hand reportedly secondary to gout.  There is no exertional chest pain or pressure no palpitations or syncope.  There is no history of ICD therapies for ventricular tachycardia.  He does have diabetes, renal insufficiency and chronic eczema.  Labs 3/7/2017 showed a white blood cell count of 2700 hemoglobin 7.4 and creatinine of 2.1.       Physical Examination Review of Systems   Vitals:    03/08/17 1359   BP: (!) 88/38   Pulse: 72     Body mass index is 22.18 kg/(m^2).  Wt Readings from Last 3 Encounters:   03/08/17 159 lb (72.1 kg)   03/07/17 159 lb (72.1 kg)   02/09/17 158 lb (71.7 kg)        Appearance:   no distress, elderly gentleman, appears pale    HEENT:   no scleral icterus, normal conjunctivae    Neck: no carotid bruits or thyromegaly   Chest/Lungs:   lungs are clear to auscultation, no rales or wheezing, prior left subclavian incision appears to be healing well without pocket infusion erythema or tenderness    Cardiovascular:   Jugular venous pressure 5 cm, Apical pulse is regular. Normal S1,S2 with grade 2/6 apical holosystolic murmur,   Abdomen:  no  Hepatosplenomegaly., nontender,  bowel sounds are present   Extremities: no cyanosis or clubbing, No edema, right hand swelling,    Skin: no xanthelasma, warm. eczema noted    Neurologic: No gross focal neurologic deficits   Mood/Affect: Alert, cooperative    General: Fever  Eyes: WNL  Ears/Nose/Throat: WNL  Lungs: Cough, Snoring, Wheezing  Heart: Irregular Heartbeat, Leg Swelling  Stomach: Diarrhea  Bladder: WNL  Muscle/Joints: WNL  Skin: WNL  Nervous System: Daytime Sleepiness, Dizziness  Mental Health: WNL     Blood: WNL     Medical History  Surgical History Family History Social History   Past Medical History:   Diagnosis Date   ? AICD (automatic  cardioverter/defibrillator) present    ? Anemia    ? Asthma    ? Cardiomyopathy    ? CKD (chronic kidney disease) stage 3, GFR 30-59 ml/min    ? Colitis    ? Diabetes    ? Eczema    ? Endocarditis due to methicillin susceptible Staphylococcus aureus (MSSA)    ? Gout    ? HTN (hypertension)    ? Left bundle branch block    ? Right shoulder pain     chronic   ? Tendonitis, Achilles, right     Past Surgical History:   Procedure Laterality Date   ? CARDIAC PACEMAKER PLACEMENT Left 04/2009    AICD   ? CATARACT EXTRACTION     ? NOSE SURGERY     ? PICC AND MIDLINE TEAM LINE INSERTION  11/28/2015        ? TX INSJ/RPLCMT PERM DFB W/TRNSVNS LDS 1/DUAL CHMBR N/A 1/18/2017    Procedure: EP ICD Insert;  Surgeon: Jese Menendez MD;  Location: [facility] Cath Lab;  Service: Cardiology   ? VASECTOMY     ? VASECTOMY     ? VENTRICULAR CARDIAC PACEMAKER INSERTION  01/18/2017    Biventricular    Family History   Problem Relation Age of Onset   ? Alzheimer's disease Mother    ? Heart disease Father     Social History     Social History   ? Marital status:      Spouse name: N/A   ? Number of children: N/A   ? Years of education: N/A     Occupational History   ? Not on file.     Social History Main Topics   ? Smoking status: Never Smoker   ? Smokeless tobacco: Not on file   ? Alcohol use 0.6 oz/week     1 Cans of beer per week      Comment: occasional   ? Drug use: No   ? Sexual activity: Yes     Partners: Female     Other Topics Concern   ? Not on file     Social History Narrative          Medications  Allergies   Current Outpatient Prescriptions   Medication Sig Dispense Refill   ? ACCU-CHEK GUILLAUME PLUS TEST STRP strips TEST THREE TIMES DAILY 100 strip 0   ? allopurinol (ZYLOPRIM) 300 MG tablet Take 1 tablet (300 mg total) by mouth daily. 30 tablet 0   ? aspirin (ASPIR-81) 81 MG EC tablet Take 1 tablet (81 mg total) by mouth daily with supper. 30 tablet 0   ? carvedilol (COREG) 25 MG tablet Take 0.5 tablets (12.5 mg total) by  "mouth 2 (two) times a day with meals. 180 tablet 3   ? cholecalciferol, vitamin D3, 1,000 unit tablet Take 1 tablet (1,000 Units total) by mouth daily. 30 tablet 0   ? colchicine 0.6 mg tablet Take 1 tablet (0.6 mg total) by mouth 2 (two) times a day. 60 tablet 2   ? ferrous sulfate 325 (65 FE) MG tablet Take 1 tablet by mouth daily with breakfast.  0   ? furosemide (LASIX) 20 MG tablet Take 20 mg by mouth 2 (two) times a day at 9am and 6pm.     ? JANUVIA 50 mg tablet TAKE 1 TABLET BY MOUTH DAILY. 30 tablet 0   ? pirbuterol (MAXAIR) 200 mcg/Inhalation inhaler Inhale 2 puffs 4 (four) times a day as needed for wheezing. Prn     ? potassium chloride SA (K-DUR,KLOR-CON) 20 MEQ tablet Take 1 tablet (20 mEq total) by mouth 2 (two) times a day. 180 tablet 3   ? Saccharomyces boulardii (FLORASTOR) 250 mg capsule Take 250 mg by mouth 3 (three) times a day.     ? simvastatin (ZOCOR) 20 MG tablet Take 1 tablet (20 mg total) by mouth bedtime. 30 tablet 0   ? tamsulosin (FLOMAX) 0.4 mg Cp24 TAKE 1 CAPSULE BY MOUTH ONCE DAILY AFTER SUPPER 30 capsule 0   ? triamcinolone (KENALOG) 0.1 % ointment Apply topically 2 (two) times a day.     ? vancomycin (VANCOCIN) 250 mg/5 mL Syrg suspension Take by mouth.       No current facility-administered medications for this visit.       Allergies   Allergen Reactions   ? Cefazolin Other (See Comments)     Severe neutropenia   ? Sulfa (Sulfonamide Antibiotics) Anaphylaxis     Throat swelling.   ? Dimethicone Rash   ? Atorvastatin      Hair Loss   ? Lisinopril Unknown   ? Losartan Other (See Comments)     Altered vision \"white out\"                                            "

## 2021-06-25 NOTE — PROGRESS NOTES
Progress Notes by Jo Tyson CNP at 1/25/2017  2:50 PM     Author: Jo Tyson CNP Service: -- Author Type: Nurse Practitioner    Filed: 1/25/2017  4:38 PM Encounter Date: 1/25/2017 Status: Signed    : Jo Tyson CNP (Nurse Practitioner)           Click to link to [facility] HEART CARE NOTE      Assessment/Recommendations   Assessment:    1. Nonischemic cardiomyopathy with systolic dysfunction, ejection fraction 35-40%: We reviewed heart failure diagnosis, medications, treatment plan, low sodium diet, weight monitoring, and symptom monitoring.  He has no symptoms of acute heart failure.  He had good recovery of his ejection fraction with previous CRT-D.  He is currently taking carvedilol but is not on ACE inhibitor or ARB due to allergy.  If his ejection fraction does not improve with CRT-D, will consider adding hydralazine and isosorbide.    2.  CRT-D: Biventricular pacing at 100%. Left subclavian incision site swollen and bruised.  Incision clean, dry, intact with no signs of infection.      Plan:  1.   Heart failure medications:  - Beta blocker therapy with carvedilol 25 mg twice daily which is his target dose  - Diuretic therapy with furosemide 20 mg twice daily  2.  Daily weights  3.  Low sodium diet    Esteban Zambrano will follow up with Dr. Santiago tomorrow and in the heart failure clinic in March after he returns from his cruise vacation.     History of Present Illness    Mr. Esteban Zambrano is a 71 y.o. male seen at [facility] heart failure clinic today for continued follow-up.  He has a history of nonischemic cardiomyopathy with systolic dysfunction since 2009.  He had a CRT-D removed in July 2016 due to MSSA endocarditis.  He had worsening of his LV function and mitral regurgitation after device was explanted.  Echocardiogram from November 21, 2016 showed an ejection fraction of 35-40% and moderate to severe mitral  regurgitation.  He had a CRT-D implanted on January 18, 2017 by Dr. Menendez.  His past medical history is also significant for hypertension, chronic kidney disease, and diabetes.    He continues to do well with no symptoms of acute heart failure.  He has swelling by his incision site but only mild pain.  He denies fatigue, lightheadedness, shortness of breath, dyspnea on exertion, orthopnea, chest pain and lower extremity edema.      He lost about 20 pounds since being sick this past summer.  He is working on regaining weight.  He is following a low-sodium diet.    ECHO 11/21/16:   Summary  Left ventricular size is moderately enlarged .  Normal left ventricular wall thickness.  Abnormal (paradoxical) septal motion consistent with conduction  abnormalities.  Left ventricular ejection fraction is visually estimated at 35-40%.  Normal right ventricular size with preserved systolic function.  Moderately enlarged left atrium.  Mild aortic regurgitation.  Moderate to severe mitral regurgitation is present.  There is mild tricuspid regurgitation with an estimated RVSP of 50 mm Hg.      When compared to previous study on 10-, mitral valve regurgitation  is worse and right ventricular pressure is mildly higher.     Physical Examination Review of Systems   Vitals:    01/25/17 1352   BP: 122/58   Pulse: 69   Resp: 16   Temp: 97.4  F (36.3  C)     Body mass index is 21.97 kg/(m^2).  Wt Readings from Last 3 Encounters:   01/25/17 162 lb (73.5 kg)   01/19/17 158 lb 12.8 oz (72 kg)   01/06/17 160 lb (72.6 kg)       General Appearance:     Alert, cooperative and in no acute distress.   ENT/Mouth: membranes moist, no oral lesions or bleeding gums.      EYES:  no scleral icterus, normal conjunctivae   Neck: no carotid bruits or thyromegaly   Chest/Lungs:   lungs are clear to auscultation, no rales or wheezing, respirations unlabored   Cardiovascular:   Regular. Normal first and second heart sounds, 2/6 systolic murmur; the  radial and posterior tibial pulses are intact, Jugular venous pressure normal, no HJR, no edema bilateral lower extremities    Abdomen:  Soft, nontender, nondistended, bowel sounds present   Extremities: no cyanosis or clubbing   Skin: warm, dry.  Left subclavian incision site swollen and bruised.  Incision clean, dry, intact with no signs of infection    Neurologic: mood and affect are appropriate, alert and oriented x3      General: WNL  Eyes: WNL  Ears/Nose/Throat: WNL  Lungs: WNL  Heart: WNL  Stomach: WNL  Bladder: WNL  Muscle/Joints: WNL  Skin: WNL  Nervous System: WNL  Mental Health: WNL     Blood: WNL       Medical History  Surgical History Family History Social History   Past Medical History   Diagnosis Date   ? AICD (automatic cardioverter/defibrillator) present    ? Anemia    ? Asthma    ? Cardiomyopathy    ? CKD (chronic kidney disease) stage 3, GFR 30-59 ml/min    ? Colitis    ? Diabetes    ? Eczema    ? Endocarditis due to methicillin susceptible Staphylococcus aureus (MSSA)    ? Gout    ? HTN (hypertension)    ? Left bundle branch block    ? Right shoulder pain      chronic   ? Tendonitis, Achilles, right     Past Surgical History   Procedure Laterality Date   ? Vasectomy     ? Nose surgery     ? Cataract extraction     ? Picc and midline team line insertion  11/28/2015         ? Vasectomy     ? Cardiac pacemaker placement Left 04/2009     AICD   ? Ventricular cardiac pacemaker insertion  01/18/2017     Biventricular   ? Pr insj/rplcmt perm dfb w/trnsvns lds 1/dual chmbr N/A 1/18/2017     Procedure: EP ICD Insert;  Surgeon: Jese Menendez MD;  Location: [facility];  Service: Cardiology    Family History   Problem Relation Age of Onset   ? Alzheimer's disease Mother    ? Heart disease Father     Social History     Social History   ? Marital status:      Spouse name: N/A   ? Number of children: N/A   ? Years of education: N/A     Occupational History   ? Not on file.     Social History  Main Topics   ? Smoking status: Never Smoker   ? Smokeless tobacco: Not on file   ? Alcohol use 0.6 oz/week     1 Cans of beer per week      Comment: occasional   ? Drug use: No   ? Sexual activity: Yes     Partners: Female     Other Topics Concern   ? Not on file     Social History Narrative          Medications  Allergies   Current Outpatient Prescriptions   Medication Sig Dispense Refill   ? ACCU-CHEK GUILLAUME PLUS TEST STRP strips TEST THREE TIMES DAILY 100 strip 0   ? allopurinol (ZYLOPRIM) 300 MG tablet Take 1 tablet (300 mg total) by mouth daily. 30 tablet 0   ? aspirin (ASPIR-81) 81 MG EC tablet Take 1 tablet (81 mg total) by mouth daily with supper. 30 tablet 0   ? carvedilol (COREG) 25 MG tablet Take 1 tablet (25 mg total) by mouth 2 (two) times a day with meals. 180 tablet 3   ? cholecalciferol, vitamin D3, 1,000 unit tablet Take 1 tablet (1,000 Units total) by mouth daily. 30 tablet 0   ? ferrous sulfate 325 (65 FE) MG tablet Take 1 tablet by mouth daily with breakfast.  0   ? furosemide (LASIX) 20 MG tablet Take 20 mg by mouth 2 (two) times a day at 9am and 6pm.     ? JANUVIA 50 mg tablet TAKE 1 TABLET BY MOUTH DAILY. 30 tablet 0   ? pirbuterol (MAXAIR) 200 mcg/Inhalation inhaler Inhale 2 puffs 4 (four) times a day as needed for wheezing. Prn     ? potassium chloride SA (K-DUR,KLOR-CON) 20 MEQ tablet Take 1 tablet (20 mEq total) by mouth 2 (two) times a day. 180 tablet 3   ? simvastatin (ZOCOR) 20 MG tablet Take 1 tablet (20 mg total) by mouth bedtime. 30 tablet 0   ? tamsulosin (FLOMAX) 0.4 mg Cp24 TAKE 1 CAPSULE BY MOUTH ONCE DAILY AFTER SUPPER 30 capsule 0   ? triamcinolone (KENALOG) 0.1 % ointment Apply topically 2 (two) times a day.       No current facility-administered medications for this visit.       Allergies   Allergen Reactions   ? Cefazolin Other (See Comments)     Severe neutropenia   ? Sulfa (Sulfonamide Antibiotics) Anaphylaxis     Throat swelling.   ? Dimethicone Rash   ? Atorvastatin       "Hair Loss   ? Lisinopril Unknown   ? Losartan Other (See Comments)     Altered vision \"white out\"         Lab Results    Chemistry CBC BNP   Lab Results   Component Value Date    CREATININE 1.48 (H) 01/19/2017    BUN 40 (H) 01/19/2017     01/19/2017    K 4.6 01/19/2017     (H) 01/19/2017    CO2 20 (L) 01/19/2017     CREATININE (mg/dL)   Date Value   01/19/2017 1.48 (H)   01/18/2017 1.55 (H)   01/06/2017 1.88 (H)   12/19/2016 2.28 (H)    Lab Results   Component Value Date    WBC 7.8 01/18/2017    HGB 8.7 (L) 01/18/2017    HCT 27.6 (L) 01/18/2017     (H) 01/18/2017     01/18/2017    Lab Results   Component Value Date     (H) 07/15/2016     BNP (pg/mL)   Date Value   07/15/2016 543 (H)   01/04/2016 1076 (H)   11/26/2015 223 (H)          40 minutes were spent with the patient with greater than 50% spent on education and counseling.      Jo Tyson, [facility]   Heart Failure Clinic                        "